# Patient Record
Sex: MALE | Race: WHITE | NOT HISPANIC OR LATINO | Employment: OTHER | URBAN - METROPOLITAN AREA
[De-identification: names, ages, dates, MRNs, and addresses within clinical notes are randomized per-mention and may not be internally consistent; named-entity substitution may affect disease eponyms.]

---

## 2023-10-11 ENCOUNTER — OFFICE VISIT (OUTPATIENT)
Dept: PRIMARY CARE | Facility: CLINIC | Age: 63
End: 2023-10-11
Payer: MEDICARE

## 2023-10-11 VITALS
RESPIRATION RATE: 17 BRPM | DIASTOLIC BLOOD PRESSURE: 86 MMHG | HEART RATE: 72 BPM | WEIGHT: 220 LBS | HEIGHT: 73 IN | SYSTOLIC BLOOD PRESSURE: 130 MMHG | TEMPERATURE: 97.8 F | BODY MASS INDEX: 29.16 KG/M2

## 2023-10-11 DIAGNOSIS — R29.898 HAND WEAKNESS: Primary | ICD-10-CM

## 2023-10-11 DIAGNOSIS — M50.90 CERVICAL NECK PAIN WITH EVIDENCE OF DISC DISEASE: ICD-10-CM

## 2023-10-11 PROBLEM — B36.0 TINEA VERSICOLOR: Status: ACTIVE | Noted: 2023-10-11

## 2023-10-11 PROBLEM — R61 EXCESSIVE SWEATING: Status: ACTIVE | Noted: 2023-10-11

## 2023-10-11 PROBLEM — K59.00 CONSTIPATION: Status: ACTIVE | Noted: 2023-10-11

## 2023-10-11 PROBLEM — T14.8XXA BRUISE: Status: ACTIVE | Noted: 2023-10-11

## 2023-10-11 PROBLEM — K59.09 CHRONIC CONSTIPATION: Status: ACTIVE | Noted: 2023-10-11

## 2023-10-11 PROBLEM — D72.829 LEUKOCYTOSIS: Status: ACTIVE | Noted: 2023-10-11

## 2023-10-11 PROBLEM — M79.89 NODULE OF SOFT TISSUE: Status: ACTIVE | Noted: 2023-10-11

## 2023-10-11 PROBLEM — K40.90 INGUINAL HERNIA: Status: ACTIVE | Noted: 2023-10-11

## 2023-10-11 PROBLEM — M19.042 LOCALIZED OSTEOARTHRITIS OF LEFT HAND: Status: ACTIVE | Noted: 2023-10-11

## 2023-10-11 PROBLEM — R07.9 CHEST PAIN: Status: ACTIVE | Noted: 2023-10-11

## 2023-10-11 PROBLEM — H61.20 CERUMEN IMPACTION: Status: ACTIVE | Noted: 2023-10-11

## 2023-10-11 PROBLEM — M75.100 ROTATOR CUFF TEAR: Status: ACTIVE | Noted: 2023-10-11

## 2023-10-11 PROBLEM — M19.012 PRIMARY OSTEOARTHRITIS OF LEFT SHOULDER: Status: ACTIVE | Noted: 2023-10-11

## 2023-10-11 PROBLEM — C49.9 SOFT TISSUE SARCOMA (MULTI): Status: ACTIVE | Noted: 2023-10-11

## 2023-10-11 PROBLEM — R35.0 URINARY FREQUENCY: Status: ACTIVE | Noted: 2023-10-11

## 2023-10-11 PROBLEM — M47.812 DJD (DEGENERATIVE JOINT DISEASE), CERVICAL: Status: ACTIVE | Noted: 2023-10-11

## 2023-10-11 PROBLEM — M25.512 LEFT SHOULDER PAIN: Status: ACTIVE | Noted: 2023-10-11

## 2023-10-11 PROBLEM — M54.2 NECK PAIN: Status: ACTIVE | Noted: 2023-10-11

## 2023-10-11 PROCEDURE — 99213 OFFICE O/P EST LOW 20 MIN: CPT | Performed by: FAMILY MEDICINE

## 2023-10-11 ASSESSMENT — ENCOUNTER SYMPTOMS
BRUISES/BLEEDS EASILY: 0
FATIGUE: 0
CONSTITUTIONAL NEGATIVE: 1
NECK STIFFNESS: 1
CHEST TIGHTNESS: 0
ENDOCRINE NEGATIVE: 1
NUMBNESS: 1
WEAKNESS: 1
SHORTNESS OF BREATH: 0
BACK PAIN: 1
MYALGIAS: 1
HEADACHES: 0
DIZZINESS: 0
NECK PAIN: 1
ARTHRALGIAS: 1

## 2023-10-11 ASSESSMENT — PATIENT HEALTH QUESTIONNAIRE - PHQ9
1. LITTLE INTEREST OR PLEASURE IN DOING THINGS: NOT AT ALL
2. FEELING DOWN, DEPRESSED OR HOPELESS: NOT AT ALL
SUM OF ALL RESPONSES TO PHQ9 QUESTIONS 1 AND 2: 0

## 2023-10-11 NOTE — PROGRESS NOTES
Subjective   Patient ID: Toño Bustamante is a 62 y.o. male who presents for Referral (Pt is here to discus a referral for rheumatologist. ).  HPI    Review of Systems   Constitutional: Negative.  Negative for fatigue.   Respiratory:  Negative for chest tightness and shortness of breath.    Cardiovascular:  Negative for chest pain.   Endocrine: Negative.    Musculoskeletal:  Positive for arthralgias, back pain, myalgias, neck pain and neck stiffness.   Neurological:  Positive for weakness and numbness. Negative for dizziness and headaches.   Hematological:  Does not bruise/bleed easily.       Objective   Physical Exam  Constitutional:       Appearance: Normal appearance.   Cardiovascular:      Rate and Rhythm: Normal rate and regular rhythm.   Pulmonary:      Effort: Pulmonary effort is normal.      Breath sounds: Normal breath sounds.   Musculoskeletal:      Left shoulder: Tenderness and crepitus present. Decreased range of motion.      Left hand: Deformity, tenderness and bony tenderness present. Decreased range of motion.      Cervical back: Rigidity, spasms and tenderness present. Pain with movement present. Decreased range of motion.      Lumbar back: Tenderness present.   Lymphadenopathy:      Cervical: No cervical adenopathy.   Neurological:      General: No focal deficit present.      Mental Status: He is alert and oriented to person, place, and time.   Psychiatric:         Mood and Affect: Mood normal.         Assessment/Plan   Problem List Items Addressed This Visit    None  Visit Diagnoses         Codes    Hand weakness    -  Primary R29.898    Relevant Orders    Referral to Orthopaedic Surgery    Referral to Orthopaedic Surgery    Cervical neck pain with evidence of disc disease     M50.90    Relevant Orders    Referral to Orthopaedic Surgery

## 2023-10-20 ENCOUNTER — ANCILLARY PROCEDURE (OUTPATIENT)
Dept: RADIOLOGY | Facility: CLINIC | Age: 63
End: 2023-10-20
Payer: MEDICARE

## 2023-10-20 ENCOUNTER — OFFICE VISIT (OUTPATIENT)
Dept: ORTHOPEDIC SURGERY | Facility: CLINIC | Age: 63
End: 2023-10-20
Payer: MEDICARE

## 2023-10-20 DIAGNOSIS — R29.898 HAND WEAKNESS: ICD-10-CM

## 2023-10-20 DIAGNOSIS — R29.898 HAND WEAKNESS: Primary | ICD-10-CM

## 2023-10-20 DIAGNOSIS — G56.02 CARPAL TUNNEL SYNDROME OF LEFT WRIST: ICD-10-CM

## 2023-10-20 PROCEDURE — 99204 OFFICE O/P NEW MOD 45 MIN: CPT | Performed by: STUDENT IN AN ORGANIZED HEALTH CARE EDUCATION/TRAINING PROGRAM

## 2023-10-20 PROCEDURE — 99214 OFFICE O/P EST MOD 30 MIN: CPT | Performed by: STUDENT IN AN ORGANIZED HEALTH CARE EDUCATION/TRAINING PROGRAM

## 2023-10-20 PROCEDURE — 73130 X-RAY EXAM OF HAND: CPT | Mod: LT,FY

## 2023-10-20 PROCEDURE — 73130 X-RAY EXAM OF HAND: CPT | Mod: LEFT SIDE | Performed by: RADIOLOGY

## 2023-10-20 RX ORDER — ACETAMINOPHEN AND DIPHENHYDRAMINE HYDROCHLORIDE 500; 25 MG/1; MG/1
1 TABLET, FILM COATED ORAL NIGHTLY PRN
COMMUNITY
End: 2024-05-14 | Stop reason: HOSPADM

## 2023-10-20 RX ORDER — IBUPROFEN 200 MG
200 TABLET ORAL EVERY 6 HOURS PRN
COMMUNITY
End: 2024-05-14 | Stop reason: HOSPADM

## 2023-10-20 RX ORDER — IBUPROFEN 100 MG/5ML
1000 SUSPENSION, ORAL (FINAL DOSE FORM) ORAL DAILY
COMMUNITY
Start: 2012-10-11 | End: 2023-11-14 | Stop reason: WASHOUT

## 2023-10-20 RX ORDER — CYCLOBENZAPRINE HCL 10 MG
10 TABLET ORAL 3 TIMES DAILY
COMMUNITY
Start: 2021-03-14 | End: 2023-11-13 | Stop reason: SDUPTHER

## 2023-10-20 RX ORDER — GLUCOSAMINE/MSM/CHONDROIT SULF 500-166.6
1 TABLET ORAL DAILY
COMMUNITY
Start: 2012-10-11 | End: 2024-05-14 | Stop reason: HOSPADM

## 2023-10-20 NOTE — PROGRESS NOTES
History of Present Illness:  Presents with  left hand numbness. The symptoms have been present for months. The patient denies any inciting trauma. The numbness primarily involves the thumb, index finger, and long finger. There is minimal numbness in the small finger. The patient complains of intermittant, moderate hand pain which is worse at night. It causes frequent night awakenings. The patient presents due to worsening symptoms.     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  States that he has always had some issues to the left hand but he has noticed progressive weakness and numbness over the course of the past weeks to months.  Now having difficulty with buttoning his pants objects due to numbness digits.    Scheduled follow-up with spine To evaluate degenerative disc disease at C5-6.    Review of Systems   GENERAL: Negative for malaise, significant weight loss, fever  MUSCULOSKELETAL: see HPI  NEURO:  Negative    The patient's past medical history, family history, social history, and review of systems were reviewed. History is otherwise negative except as stated in the HPI.    Physical Examination:  The patient appears to be their stated age, is in no apparent distress, and is oriented x3. The patients mood and affect are appropriate. The patients gait is normal. The examination of left upper extremity performed.  On musculoskeletal examination, the patient has full elbow range of motion. There is no tenderness to palpation about the lateral epicondyle. Tinels at the cubital tunnel and elbow flexion/compression are negative for ulnar nerve symptoms. In regards to the wrist, there is no obvious deformity. Range of motion is full in flexion, extension, pronation, and supination. Strength is 5/5 in flexion and extension. There is no tenderness to palpation about the 1st dorsal compartment, the 1st CMC joint, or the TFCC. Tinels at the carpal  tunnel and Durkins compression test are positive. The patient has subjective paresthesias in the median nerve distribution. Sensation and motor function are intact in the radial, and ulnar nerve distribution. There is no obvious thenar atrophy, and thenar strength is 4/5. There is no intrinsic atrophy, and intrinsic strength is 5/5. All fingers are without triggering and are without pain over the A1 pulley. The patient can make a full composite fist. The hand itself is warm and well perfused. The skin is intact throughout. The contralateral hand/wrist are normal to inspection, range of motion, stability, and strength.    Imaging:  Left hand AP lateral oblique reviewed.  Diffuse osteoarthritis.  Deformity of the long finger DIP with associated arthritis.  Significant arthritis to index MCP    Assessment:  Patient with left carpal tunnel syndrome.  Also with degenerative joint disease to the spine and concern for double crush.    Plan:   EMG. I had a long discussion with the patient regarding the diagnosis of CTS and its treatment. At this point, I have recommended an EMG/NCV study to help confirm the diagnosis and assess severity. I would like to seem them back in the office after this study. In the meantime, I have recommended nighttime wrist splinting in the neutral position. When I see them back, we will determine how to proceed based on the results of the nerve study and the trial of splinting.        April Real MD  Orthopaedic Surgeon

## 2023-10-24 ENCOUNTER — HOSPITAL ENCOUNTER (OUTPATIENT)
Dept: NEUROLOGY | Facility: HOSPITAL | Age: 63
Discharge: HOME | End: 2023-10-24
Payer: MEDICARE

## 2023-10-24 DIAGNOSIS — G56.02 CARPAL TUNNEL SYNDROME OF LEFT WRIST: ICD-10-CM

## 2023-10-24 PROCEDURE — 95911 NRV CNDJ TEST 9-10 STUDIES: CPT | Performed by: PSYCHIATRY & NEUROLOGY

## 2023-10-24 PROCEDURE — 95886 MUSC TEST DONE W/N TEST COMP: CPT | Performed by: PSYCHIATRY & NEUROLOGY

## 2023-10-31 ENCOUNTER — OFFICE VISIT (OUTPATIENT)
Dept: ORTHOPEDIC SURGERY | Facility: CLINIC | Age: 63
End: 2023-10-31
Payer: MEDICARE

## 2023-10-31 DIAGNOSIS — G56.02 CARPAL TUNNEL SYNDROME OF LEFT WRIST: Primary | ICD-10-CM

## 2023-10-31 PROCEDURE — 99214 OFFICE O/P EST MOD 30 MIN: CPT | Performed by: STUDENT IN AN ORGANIZED HEALTH CARE EDUCATION/TRAINING PROGRAM

## 2023-10-31 NOTE — PROGRESS NOTES
Follow up:  The patient returns today to review their EMG/NCV. Their symptoms are persistent and limiting.      Physical Examination:  unchanged    Imaging:  I independently interpreted the EMG (and reviewed its associated report) which reveals  severe CTS with motor axon loss    Assessment:  Patient with severe cts    Plan:  Based on the history, physical exam, and EMG/NCV study, I believe that the patient has severe CTS. Given the duration of symptoms and the failure of non-operative treatment with splinting and/or corticosteroid injections, I have recommended surgery.  The benefit of surgery would be to stop the progression of symptoms, with the hope that the symptoms would also resolve. The risks of surgery include, but are not limited to, infection, bleeding, nerve/vessel injury, pillar pain, continued symptoms, and anesthetic complications. The patient understood the risks/benefits and consented to surgery. I will schedule them in the near future. I have answered all questions regarding the surgery itself and the post-operative course.

## 2023-11-01 PROBLEM — G56.02 CARPAL TUNNEL SYNDROME, LEFT UPPER LIMB: Status: ACTIVE | Noted: 2023-10-31

## 2023-11-10 ENCOUNTER — APPOINTMENT (OUTPATIENT)
Dept: ORTHOPEDIC SURGERY | Facility: CLINIC | Age: 63
End: 2023-11-10
Payer: MEDICARE

## 2023-11-13 ENCOUNTER — OFFICE VISIT (OUTPATIENT)
Dept: ORTHOPEDIC SURGERY | Facility: CLINIC | Age: 63
End: 2023-11-13
Payer: MEDICARE

## 2023-11-13 ENCOUNTER — ANCILLARY PROCEDURE (OUTPATIENT)
Dept: RADIOLOGY | Facility: CLINIC | Age: 63
End: 2023-11-13
Payer: MEDICARE

## 2023-11-13 DIAGNOSIS — M54.2 NECK PAIN: ICD-10-CM

## 2023-11-13 DIAGNOSIS — M54.2 NECK PAIN: Primary | ICD-10-CM

## 2023-11-13 PROCEDURE — 99214 OFFICE O/P EST MOD 30 MIN: CPT | Performed by: PHYSICIAN ASSISTANT

## 2023-11-13 PROCEDURE — 72050 X-RAY EXAM NECK SPINE 4/5VWS: CPT | Performed by: RADIOLOGY

## 2023-11-13 PROCEDURE — 72050 X-RAY EXAM NECK SPINE 4/5VWS: CPT

## 2023-11-13 PROCEDURE — 99214 OFFICE O/P EST MOD 30 MIN: CPT | Mod: 25 | Performed by: PHYSICIAN ASSISTANT

## 2023-11-13 RX ORDER — CYCLOBENZAPRINE HCL 10 MG
10 TABLET ORAL NIGHTLY PRN
Qty: 30 TABLET | Refills: 0 | Status: SHIPPED | OUTPATIENT
Start: 2023-11-13 | End: 2024-02-21 | Stop reason: ALTCHOICE

## 2023-11-13 NOTE — PROGRESS NOTES
Toño Bustamante is a 62 y.o. male who presents for New Patient Visit of the Neck (Xrays today, Neck pain).    HPI:  62-year-old gentleman with chronic history of neck pain.  He denies any fever chills nausea vomiting night sweats he has no bowel or bladder complaints.    Physical exam:  Well-nourished, well kept.  Patient has right arm amputated in 1984.  No examination to that side.  No lymphangitis or lymphadenopathy in the examined extremities.  Good perfusion to the extremities.  No distal edema.  Patient can rise from a seated position, can sit from a standing position. Can get up heels and toes.  Patient is tender in the paraspinal musculature of the cervical spine is range of motion is mildly decreased secondary to some pain and stiffness no weakness no instability to muscle strength. examination of the upper extremities reveals no point tenderness, swelling, or deformity.  Range of motion of the LEFT shoulder, elbow, wrist, and fingers are full without crepitance, instability, or exacerbation of pain. Strength is 5/5 throughout. XX no redness, abrasions, or lesions on the upper extremities bilaterally.  Gross sensation intact to the extremities.  Deep tendon reflexes 2+ and symmetric bilaterally.  Ferraro negative.  Affect normal.  Alert and oriented X 3.  Coordination normal. Positive Tinnell's left.    Imaging studies:  AP lateral flexion-extension plain films of the cervical spine were obtained and reviewed today.  Multilevel moderate to severe degenerative changes noted most significant at C5-6 C6-7 with loss of disc space height and anterior osteophyte formation.    Assessment:  62-year-old gentleman with chronic history of neck pain.  He had his right arm amputated in 1984 due to cancer.  Has had a chronic history of neck pain with limited range of motion.  He is having carpal tunnel surgery on his left wrist with Dr. Malcom Bedoya in 1 week.  He has done some physical therapy in the past.  Nothing  recently.  No surgery on his neck no recent shots in his neck.  He is not really describing any radicular type symptoms this is mostly a neck and trapezius issue.    Plan:  Like to get him into some physical therapy, something with a manual component with modalities.  We can get him some Flexeril to take at night at bedtime to help with spasms.  I will see him back in 6 weeks, see how he is healing from his carpal tunnel surgery and see how his neck is doing.  If he is not getting any better with therapy and some muscle relaxers we can get an MRI of his neck and work this up further.

## 2023-11-13 NOTE — DISCHARGE INSTRUCTIONS
HAND SURGERY  Post Operative Instructions   April Real MD     Dressing  You have a bulky dressing on your hand.      You may remove the dressing from your hand…    - 5 days after your surgery    - Please cover the wound with a Band-Aid (NOT WATERPROOF TYPE) or gauze and change it daily.  Please leave wound open to air when at home. Do not use any ointments, peroxide, betadine, alcohol ect. on your wound.     - After your dressing is off, do not do any strenuous activities with your hand until seen by OT or Dr. Real within 2 weeks postoperatively. Please call Dr. Real's office if this is not already arranged.    If you experience persistent numbness, tingling or burning sensation in your hand, it may be due to a cast or surgical dressing that is too tight. Keep your hand elevated, and if this does not resolve the problem, call your doctor immediately.    If your dressing gets wet, contact your physician’s office.     Activity  If pain will allow, try to flex and extend the fingers on the operated hand.  The dressing and swelling may cause the fingers to be stiff and this is common.  If the fingers turn blue, purple, or remain numb after the block has worn off, call the office immediately.      Showering  You may shower as soon as you want after surgery. Please cover the dressing with a bag.    You may shower and get the wound wet after you remove your dressing.  You may allow the water to run over the incisions and pat the incision dry. DO NOT let the wound soak in a tub, pool, or dish water. If you are using a band aid to cover your incision after a shower, make sure the incision is completely dry.    Ice & Elevate  The use of ice on your arm/hand after surgery will help with both pain control and swelling.  Continue with icing your arm/hand for the first 48 hours after surgery.  Thereafter, use it on an as needed basis.    Elevate your hand above your heart level as much as possible for the first 48 hours,  even while walking.  This will keep the swelling to a minimum and prevent throbbing and pain. Elevation reduces swelling and minimizes pain. Less swelling is associated with a lower infection rate, fewer wound complications, less post-operative stiffness, and more rapid recovery of function. To keep the swelling down, your hand must be kept above the level of your heart.     One common mistake people make is they will put pillows or blankets under their elbow while sitting or laying down. Please always keep the hand above the elbow and move your fingers as much as possible! Swelling tends to collect in the lowest part of the body so if your hand is below the rest of your arm, your fingers and hand will swell and become stiff.    Pain Medication  You will be discharged from the surgery center to home with an oral pain medication (analgesic).  The majority of patients use antiinflammatories like Motrin or Tylenol with sufficient pain relief but the prescription is available for breakthrough pain not responsive to the antiinflammatory medications. Rest and elevation is still one of the most important factors for pain control.     DO NOT drink alcoholic beverages or smoke while taking the prescription pain medication. DO NOT drive a car or other vehicle or operate any machinery while under the influence of your medication.    It is important NOT TO WAIT until your pain is severe before taking your medication since the medications often take an hour or longer before you will begin to feel some relief. Take the medication as soon as you experience even mild pain the first night after surgery. Usually by the second or third day after surgery your upper extremity will begin to feel better and you will require much less pain medication.    **Prescription refills will only be addressed during normal business hours.  Narcotic refills will not be addressed after hours or on weekends as the physician on call does not have access  to your medical records.**    Side Effects: All pain medications can cause nausea, vomiting, and/or constipation. It is best to take pain medication with food. If these problems become significant, please contact our office. Have a phone number for your pharmacy available.    Diet  Eat light the day of surgery and resume normal diet tomorrow. Increase your fluid intake due to pain medications    Driving  It is not advisable to drive a vehicle while you are on pain medication, due to the possible side effects.  However, once you are off pain medication and you feel that you are able to safely control the vehicle, you may drive.    Warning Signs  Fever: A low-grade fever (less than 101 degrees) following surgery and lasting for several days is quite common. You may even have some slight chills and sweating. If you have a low-grade fever you should make an effort to cough and breathe deeply to clear congestion from your lungs.     After hospital discharge, call your physician if you experience:  A temperature over 101 degrees (38.6 C)   Increasing pain (despite adequate elevation and maximal oral pain medication)   Increasing or foul smelling drainage (after the first 24 hours)     If any of the above occurs, please notify Dr. Real's office.      Follow-up Appointments    We are interested in your prompt and complete recovery from surgery. If you have any problems or questions concerning your recovery, please call your doctor’s office.  Your doctor’s office can be called Monday --Friday, 8:00 am to 5:00 pm.     You should already have an appointment to see Dr. Real within the next few weeks.  If you do not have this appointment, or need to change your appointment time, please contact our office.     Do not hesitate to call with any questions or concerns.      Dr. Real's office numbers are:    1) During normal business hours, 8AM to 5PM Monday through Friday, please call: [phone]  2) After hours questions  can be directed to the physician on call at [phone]  Nerve Blocks    Why is this procedure done?  Nerve blocks can help to manage pain. By giving you a drug into an exact group of nerves, your doctor may be able to block pain to a specific part of your body. Some nerve blocks are used after surgery, especially if you have had an abdominal surgery. Nerve blocks are used before surgery to lessen the need for opioid drugs during and after surgery.  There are a few kinds of nerve blocks. Some nerve blocks are used to:  Treat pain. These may have a pain drug and a drug to help with swelling.  Find where your pain is coming from. This kind of nerve block will have a pain drug that lasts for only a certain amount of time.  See if another kind of treatment like surgery will help your pain.  Prevent pain during or after a procedure.  Help you avoid surgery.  Give relief of pain during and after surgery.  Lessen the use of opioid drugs needed for pain after surgery.  Block the pain in an area of the body during surgery as anesthesia.  What will the results be?  The nerve block may help to treat or ease your pain. The area may be numb. You may have some pain relief right away. You may be able to use fewer pain medicines after surgery. It also may be easier for you to move around after surgery. Some nerve blocks go away within a few hours. Others give you pain relief for a day or so to a few months or longer. Some nerve blocks can take a few days to work fully.  What happens before the procedure?  Your doctor will ask you about your health history. Talk to the doctor about:  All the drugs you are taking. Be sure to include all prescription, over the counter, and herbal supplements. Tell the doctor if you have any drug allergy. Bring a list of drugs you take with you.  Any bleeding problems. Be sure to tell your doctor if you are taking any drugs that may cause bleeding. Some of these are warfarin, rivaroxaban, apixaban,  ticagrelor, clopidogrel, ibuprofen, naproxen, or aspirin. Certain vitamins and herbs, such as garlic and fish oil, may also add to the risk for bleeding. You may need to stop these drugs as well. Talk to your doctor about them.  What happens during the procedure?  Sometimes, the doctor will give you a special drug to make you sleepy for the nerve block. Other times, you are completely awake. You may also have a nerve block as a part of your surgery.  The doctor will position you in a way to give them easy access to where you will be having the nerve block.  The doctor will clean the area and give you a local numbing drug. The doctor will use a long thin needle to give you the nerve block. Often, the doctor will use a special x-ray, ultrasound, or CT scan to make sure the needle is in the right place. The doctor will inject the drug close to the nerve that is causing your pain. Sometimes they inject the drug in an area that will block pain from a few nerves.  The doctor will take out the needle and place a clean bandage on your skin.  Sometimes, the doctor may leave a catheter in place to deliver medicine over 1 to 2 days. You may have to return to your doctor's office to have the catheter removed.  The procedure takes 15 to 30 minutes.  What happens after the procedure?  If you are not having surgery, you will be able to go home the same day. You may be asked to rest for 15 to 30 minutes. If the doctor gives you a special drug to make you sleepy for the procedure, you will need someone to drive you home.  What care is needed at home?  You will be allowed to shower or take a bath later that same day unless you have a catheter still in place.  You may need other medicines to help with pain as your nerve block wears off.  What follow-up care is needed?  As your body absorbs the drugs, your pain may come back. Talk to your doctor about if you need another nerve block and how often you can have a nerve block.  What  problems could happen?  Infection  Bleeding or bruising  Pain at the injection site  Raised blood sugar  Rash  Itching  Numbness  Nerve injury  Allergic reaction  Puncture or laceration of an organ like the liver, spleen. or bowel  Numbing medicine injected into a blood vessel  Where can I learn more?  American Society of Regional Anesthesia  https://www.michelle.com/patient-information/regional-anesthesia  NHS  https://www.nhs.uk/conditions/epidural/  [facility]  https://www.nhs.uk/conditions/local-anaesthesia/  [employer] [place]  http://www.radiologyinfo.org/en/info.cfm?pg=nerveblock  Last Reviewed Date  2020-04-22

## 2023-11-14 RX ORDER — DEXTROMETHORPHAN HYDROBROMIDE, GUAIFENESIN 5; 100 MG/5ML; MG/5ML
650 LIQUID ORAL EVERY 8 HOURS PRN
COMMUNITY

## 2023-11-14 NOTE — PREPROCEDURE INSTRUCTIONS
Reviewed medical history and current medications. Patient is having a local procedure. Instructed patient to continue medications with no dietary/fluid restrictions.

## 2023-11-16 ENCOUNTER — HOSPITAL ENCOUNTER (OUTPATIENT)
Facility: HOSPITAL | Age: 63
Setting detail: OUTPATIENT SURGERY
Discharge: HOME | End: 2023-11-16
Attending: STUDENT IN AN ORGANIZED HEALTH CARE EDUCATION/TRAINING PROGRAM | Admitting: STUDENT IN AN ORGANIZED HEALTH CARE EDUCATION/TRAINING PROGRAM
Payer: MEDICARE

## 2023-11-16 VITALS
DIASTOLIC BLOOD PRESSURE: 78 MMHG | WEIGHT: 218.56 LBS | BODY MASS INDEX: 28.97 KG/M2 | HEIGHT: 73 IN | OXYGEN SATURATION: 97 % | HEART RATE: 67 BPM | SYSTOLIC BLOOD PRESSURE: 133 MMHG | TEMPERATURE: 97.5 F | RESPIRATION RATE: 17 BRPM

## 2023-11-16 DIAGNOSIS — G56.02 CARPAL TUNNEL SYNDROME, LEFT UPPER LIMB: Primary | ICD-10-CM

## 2023-11-16 PROCEDURE — 2500000005 HC RX 250 GENERAL PHARMACY W/O HCPCS: Performed by: STUDENT IN AN ORGANIZED HEALTH CARE EDUCATION/TRAINING PROGRAM

## 2023-11-16 PROCEDURE — 7100000010 HC PHASE TWO TIME - EACH INCREMENTAL 1 MINUTE: Performed by: STUDENT IN AN ORGANIZED HEALTH CARE EDUCATION/TRAINING PROGRAM

## 2023-11-16 PROCEDURE — 64721 CARPAL TUNNEL SURGERY: CPT | Performed by: STUDENT IN AN ORGANIZED HEALTH CARE EDUCATION/TRAINING PROGRAM

## 2023-11-16 PROCEDURE — 2500000004 HC RX 250 GENERAL PHARMACY W/ HCPCS (ALT 636 FOR OP/ED): Performed by: STUDENT IN AN ORGANIZED HEALTH CARE EDUCATION/TRAINING PROGRAM

## 2023-11-16 PROCEDURE — 7100000009 HC PHASE TWO TIME - INITIAL BASE CHARGE: Performed by: STUDENT IN AN ORGANIZED HEALTH CARE EDUCATION/TRAINING PROGRAM

## 2023-11-16 PROCEDURE — 3600000008 HC OR TIME - EACH INCREMENTAL 1 MINUTE - PROCEDURE LEVEL THREE: Performed by: STUDENT IN AN ORGANIZED HEALTH CARE EDUCATION/TRAINING PROGRAM

## 2023-11-16 PROCEDURE — 3600000003 HC OR TIME - INITIAL BASE CHARGE - PROCEDURE LEVEL THREE: Performed by: STUDENT IN AN ORGANIZED HEALTH CARE EDUCATION/TRAINING PROGRAM

## 2023-11-16 PROCEDURE — A4217 STERILE WATER/SALINE, 500 ML: HCPCS | Performed by: STUDENT IN AN ORGANIZED HEALTH CARE EDUCATION/TRAINING PROGRAM

## 2023-11-16 RX ORDER — LIDOCAINE HYDROCHLORIDE AND EPINEPHRINE 10; 10 MG/ML; UG/ML
20 INJECTION, SOLUTION INFILTRATION; PERINEURAL ONCE
Status: COMPLETED | OUTPATIENT
Start: 2023-11-16 | End: 2023-11-16

## 2023-11-16 RX ORDER — SODIUM CHLORIDE 0.9 G/100ML
IRRIGANT IRRIGATION AS NEEDED
Status: DISCONTINUED | OUTPATIENT
Start: 2023-11-16 | End: 2023-11-16 | Stop reason: HOSPADM

## 2023-11-16 RX ORDER — TRAMADOL HYDROCHLORIDE 50 MG/1
50 TABLET ORAL EVERY 8 HOURS PRN
Qty: 5 TABLET | Refills: 0 | Status: SHIPPED | OUTPATIENT
Start: 2023-11-16 | End: 2023-11-19

## 2023-11-16 RX ORDER — LIDOCAINE HYDROCHLORIDE AND EPINEPHRINE 10; 10 MG/ML; UG/ML
20 INJECTION, SOLUTION INFILTRATION; PERINEURAL ONCE
Status: DISCONTINUED | OUTPATIENT
Start: 2023-11-16 | End: 2023-11-16

## 2023-11-16 RX ADMIN — LIDOCAINE HYDROCHLORIDE,EPINEPHRINE BITARTRATE 20 ML: 10; .01 INJECTION, SOLUTION INFILTRATION; PERINEURAL at 06:31

## 2023-11-16 ASSESSMENT — COLUMBIA-SUICIDE SEVERITY RATING SCALE - C-SSRS
1. IN THE PAST MONTH, HAVE YOU WISHED YOU WERE DEAD OR WISHED YOU COULD GO TO SLEEP AND NOT WAKE UP?: NO
2. HAVE YOU ACTUALLY HAD ANY THOUGHTS OF KILLING YOURSELF?: NO
6. HAVE YOU EVER DONE ANYTHING, STARTED TO DO ANYTHING, OR PREPARED TO DO ANYTHING TO END YOUR LIFE?: NO

## 2023-11-16 ASSESSMENT — PAIN - FUNCTIONAL ASSESSMENT
PAIN_FUNCTIONAL_ASSESSMENT: 0-10
PAIN_FUNCTIONAL_ASSESSMENT: 0-10

## 2023-11-16 ASSESSMENT — PAIN SCALES - GENERAL
PAINLEVEL_OUTOF10: 0 - NO PAIN

## 2023-11-16 NOTE — H&P
Updated H&P    Patient presents for local hand surgery.     Prior notes, medication, PMH, PSH, allergies reviewed.     PE  CTAB  RRR  Abdomen soft    Plan to proceed with elective surgery today  All questions addressed     April Beatty MD

## 2023-11-16 NOTE — OP NOTE
CARPAL TUNNEL RELEASE- LOCAL  PREOPERATIVE DIAGNOSIS:   left Carpal Tunnel Syndrome  POSTOPERATIVE DIAGNOSIS: left Carpal Tunnel Syndrome  PROCEDURE:                              left Open Carpal Tunnel Release ([id number])    SURGEON:                                    ESTEFANÍA STEELE MD    Assistant: Montserrat ALEMAN    ANESTHESIA:                              Local.    COMPLICATIONS:                     None.    Patient Name: Toño Bustamante  MRN: [id number]  Time: 8:10 AM    Estimated Blood Loss: 0ml    Problem List:  Patient Active Problem List   Diagnosis    Bruise    Cerumen impaction    Chest pain    Chronic constipation    Constipation    DJD (degenerative joint disease), cervical    Excessive sweating    Inguinal hernia    Left shoulder pain    Leukocytosis    Localized osteoarthritis of left hand    Neck pain    Nodule of soft tissue    Primary osteoarthritis of left shoulder    Rotator cuff tear    Soft tissue sarcoma (CMS/HCC)    Urinary frequency    Tinea versicolor    Carpal tunnel syndrome, left upper limb       INDICATIONS FOR SURGERY:    The patient has been treated for on-going carpal tunnel syndrome that has failed non-operative treatment. Surgical release was offered. Potential benefits include relief of paraesthesias and preventing progression of nerve dysfunction. Risks include bleeding, infection, nerve injury, tendon injury, persistent numbness & paraesthesias, and pillar pain. The patient understood and consented willingly to surgery. The patient also understood the recovery and rehabilitation process.    DESCRIPTION OF PROCEDURE:  The patient was brought to the operating room and identified by the surgical staff. The operative limb was also confirmed by the surgical staff. The operative site in the hand was injected with a local anesthetic for timo-operative pain control. Next, the median nerve in the wrist was injected with a local anesthestic for post-operative pain control. There was no  anesthesiology staff present nor any administration of anesthetics through an IV during the case.  The operative limb was then prepped and draped in standard sterile fashion. A 2cm longitudinal incision was placed in the inter-thenar valley of the hand in line with the third webspace. Diligent hemostasis was maintained with bipolar cautery. The superficial palmar fascia was cut in line with the skin incision. The transverse carpal ligament was identified. Once satisfied that the recurrent motor branch was not traversing the ligament it was released completely distally to the sentinel pad of fat and proximally past the wrist crease into the forearm. Once satisfied that the carpal tunnel and median nerve was completely decompressed the wound was washed. The incision was closed. A soft dressing was applied. The patient was taken to the recovery in stable condition.  I was present for the entire length of the case.     April Beatty MD

## 2023-11-16 NOTE — POST-PROCEDURE NOTE
I was the surgical first assist to Dr. Real for Toño Bustamante's procedure on 11/16/23.    Procedure:   Left Open Carpal Tunnel Release     Montserrat Dunn PA-C

## 2023-12-01 ENCOUNTER — OFFICE VISIT (OUTPATIENT)
Dept: ORTHOPEDIC SURGERY | Facility: CLINIC | Age: 63
End: 2023-12-01
Payer: MEDICARE

## 2023-12-01 DIAGNOSIS — R29.898 HAND WEAKNESS: ICD-10-CM

## 2023-12-01 PROCEDURE — 1160F RVW MEDS BY RX/DR IN RCRD: CPT | Performed by: STUDENT IN AN ORGANIZED HEALTH CARE EDUCATION/TRAINING PROGRAM

## 2023-12-01 PROCEDURE — 1125F AMNT PAIN NOTED PAIN PRSNT: CPT | Performed by: STUDENT IN AN ORGANIZED HEALTH CARE EDUCATION/TRAINING PROGRAM

## 2023-12-01 PROCEDURE — 1159F MED LIST DOCD IN RCRD: CPT | Performed by: STUDENT IN AN ORGANIZED HEALTH CARE EDUCATION/TRAINING PROGRAM

## 2023-12-01 PROCEDURE — 99024 POSTOP FOLLOW-UP VISIT: CPT | Performed by: STUDENT IN AN ORGANIZED HEALTH CARE EDUCATION/TRAINING PROGRAM

## 2023-12-01 RX ORDER — CEPHALEXIN 500 MG/1
500 CAPSULE ORAL EVERY 6 HOURS
Qty: 28 CAPSULE | Refills: 0 | Status: SHIPPED | OUTPATIENT
Start: 2023-12-01 | End: 2023-12-08

## 2023-12-01 NOTE — PROGRESS NOTES
S/p LCTR. Some drainage to the incision    Physical Examination:  The patient appears to be their stated age, is in no apparent distress, and is oriented x3. The patients mood and affect are appropriate. The patients gait is normal. The examination of the limb in question was performed in comparison to the contralateral limb.    Some drainage to incision, with some dehiscence.   AIN, PIN, ulnar intact  SILT A/R/U/M  Hand wwp      Assessment:  2 weeks post op    Plan:   some drainage and dehiscence.  I also would like him to do twice daily soapy baths.  Remainder of the time the incision should be kept open to air.  I would like him to start antibiotics for 1 week and follow-up next week for clinical check.    April Real MD

## 2023-12-08 ENCOUNTER — OFFICE VISIT (OUTPATIENT)
Dept: ORTHOPEDIC SURGERY | Facility: CLINIC | Age: 63
End: 2023-12-08
Payer: MEDICARE

## 2023-12-08 DIAGNOSIS — G56.02 CARPAL TUNNEL SYNDROME OF LEFT WRIST: Primary | ICD-10-CM

## 2023-12-08 PROCEDURE — 99024 POSTOP FOLLOW-UP VISIT: CPT | Performed by: STUDENT IN AN ORGANIZED HEALTH CARE EDUCATION/TRAINING PROGRAM

## 2023-12-08 PROCEDURE — 1125F AMNT PAIN NOTED PAIN PRSNT: CPT | Performed by: STUDENT IN AN ORGANIZED HEALTH CARE EDUCATION/TRAINING PROGRAM

## 2023-12-08 PROCEDURE — 1159F MED LIST DOCD IN RCRD: CPT | Performed by: STUDENT IN AN ORGANIZED HEALTH CARE EDUCATION/TRAINING PROGRAM

## 2023-12-08 PROCEDURE — 1160F RVW MEDS BY RX/DR IN RCRD: CPT | Performed by: STUDENT IN AN ORGANIZED HEALTH CARE EDUCATION/TRAINING PROGRAM

## 2023-12-08 RX ORDER — SILVER SULFADIAZINE 10 G/1000G
CREAM TOPICAL 2 TIMES DAILY
Status: DISCONTINUED | OUTPATIENT
Start: 2023-12-08 | End: 2024-05-14 | Stop reason: HOSPADM

## 2023-12-08 NOTE — PROGRESS NOTES
S/p LCTR.  3 weeks.  No further drainage from his incision.  It has dried out and is looking improved    Physical Examination:  The patient appears to be their stated age, is in no apparent distress, and is oriented x3. The patients mood and affect are appropriate. The patients gait is normal. The examination of the limb in question was performed in comparison to the contralateral limb.    No further drainage today.  He is healing this by secondary intention.  Further granulation tissue seen  AIN, PIN, ulnar intact  SILT A/R/U/M  Hand wwp      Assessment:  3 weeks post op    Plan:   Continue soaking baths twice a day.  I have prescribed Silvadene but I would like him to use once a day.  Follow-up in 2 weeks.    April Real MD

## 2023-12-11 ENCOUNTER — OFFICE VISIT (OUTPATIENT)
Dept: ORTHOPEDIC SURGERY | Facility: CLINIC | Age: 63
End: 2023-12-11
Payer: MEDICARE

## 2023-12-11 DIAGNOSIS — M50.90 CERVICAL NECK PAIN WITH EVIDENCE OF DISC DISEASE: ICD-10-CM

## 2023-12-11 DIAGNOSIS — G56.02 CARPAL TUNNEL SYNDROME OF LEFT WRIST: Primary | ICD-10-CM

## 2023-12-11 DIAGNOSIS — M54.12 CERVICAL RADICULOPATHY: Primary | ICD-10-CM

## 2023-12-11 DIAGNOSIS — R29.898 HAND WEAKNESS: ICD-10-CM

## 2023-12-11 PROCEDURE — 1159F MED LIST DOCD IN RCRD: CPT | Performed by: PHYSICIAN ASSISTANT

## 2023-12-11 PROCEDURE — 1125F AMNT PAIN NOTED PAIN PRSNT: CPT | Performed by: PHYSICIAN ASSISTANT

## 2023-12-11 PROCEDURE — 99213 OFFICE O/P EST LOW 20 MIN: CPT | Performed by: PHYSICIAN ASSISTANT

## 2023-12-11 PROCEDURE — 1160F RVW MEDS BY RX/DR IN RCRD: CPT | Performed by: PHYSICIAN ASSISTANT

## 2023-12-11 RX ORDER — SILVER SULFADIAZINE 10 G/1000G
CREAM TOPICAL 2 TIMES DAILY
Status: DISCONTINUED | OUTPATIENT
Start: 2023-12-11 | End: 2024-05-14 | Stop reason: HOSPADM

## 2023-12-11 ASSESSMENT — PAIN - FUNCTIONAL ASSESSMENT: PAIN_FUNCTIONAL_ASSESSMENT: NO/DENIES PAIN

## 2023-12-11 NOTE — PROGRESS NOTES
Toño Bustamante is a 63 y.o. male who presents for New Patient Visit of the Neck (Xrays done).    HPI:  63-year-old gentleman here for follow-up after physical therapy.  Still having stiffness and pain in his neck.  He denies any fever chills nausea vomiting night sweats.  He has no bowel or bladder complaints.    Physical exam:  Well-nourished, well kept.  Patient had right arm amputated in 1984.  No examination of the right upper extremity.  Patient can rise from a seated position, can sit from a standing position. Can get up heels and toes.  Patient is tender in the paraspinal musculature of the cervical spine is range of motion is mildly decreased secondary to some pain and stiffness no weakness no instability to muscle strength. examination of the upper extremities reveals no point tenderness, swelling, or deformity.  Range of motion of the LEFT shoulders, elbows, wrists, and fingers are full without crepitance, instability, or exacerbation of pain. Strength is 5/5 throughout. no redness, abrasions, or lesions on the upper extremities bilaterally.  Gross sensation intact to the extremities.  Deep tendon reflexes 2+ and symmetric bilaterally.  Ferraro negative.  Affect normal.  Alert and oriented X 3.  Coordination normal.    No imaging studies were done today.    Assessment:  63-year-old gentleman here for follow-up after physical therapy.  He did 8 visits of therapy so far, he did get a little bit of benefit from it, but he is not where he would like to be as far as his neck pain is concerned.  He did recently have a left carpal tunnel release with Dr. April Real and aside from some minor issues with the incision healing, he feels very much better in regards to his numbness and tingling in his hand.     Plan:  He is still having neck stiffness and pain, and he is recovering from his carpal tunnel surgery on the left hand.  I would like to get an MRI of his cervical spine and work this up further, the  patient would like to wait until after the first of the year until he can recover fully from his carpal tunnel surgery.  He will continue to do home exercises from physical therapy and we will go ahead and submit the order for the MRI and I will date it after the first of the year.  We can get that done and he can come back to see me when he is ready.

## 2023-12-12 DIAGNOSIS — G56.02 CARPAL TUNNEL SYNDROME OF LEFT WRIST: Primary | ICD-10-CM

## 2023-12-12 RX ORDER — SILVER SULFADIAZINE 10 G/1000G
CREAM TOPICAL DAILY
Qty: 50 G | Refills: 1 | Status: SHIPPED | OUTPATIENT
Start: 2023-12-12 | End: 2024-02-21 | Stop reason: ALTCHOICE

## 2023-12-22 ENCOUNTER — OFFICE VISIT (OUTPATIENT)
Dept: ORTHOPEDIC SURGERY | Facility: CLINIC | Age: 63
End: 2023-12-22
Payer: MEDICARE

## 2023-12-22 DIAGNOSIS — G56.02 CARPAL TUNNEL SYNDROME OF LEFT WRIST: Primary | ICD-10-CM

## 2023-12-22 PROCEDURE — 1159F MED LIST DOCD IN RCRD: CPT | Performed by: STUDENT IN AN ORGANIZED HEALTH CARE EDUCATION/TRAINING PROGRAM

## 2023-12-22 PROCEDURE — 1160F RVW MEDS BY RX/DR IN RCRD: CPT | Performed by: STUDENT IN AN ORGANIZED HEALTH CARE EDUCATION/TRAINING PROGRAM

## 2023-12-22 PROCEDURE — 99024 POSTOP FOLLOW-UP VISIT: CPT | Performed by: STUDENT IN AN ORGANIZED HEALTH CARE EDUCATION/TRAINING PROGRAM

## 2023-12-22 PROCEDURE — 1125F AMNT PAIN NOTED PAIN PRSNT: CPT | Performed by: STUDENT IN AN ORGANIZED HEALTH CARE EDUCATION/TRAINING PROGRAM

## 2023-12-22 ASSESSMENT — PAIN SCALES - GENERAL: PAINLEVEL_OUTOF10: 0 - NO PAIN

## 2023-12-22 ASSESSMENT — PAIN - FUNCTIONAL ASSESSMENT: PAIN_FUNCTIONAL_ASSESSMENT: 0-10

## 2023-12-22 NOTE — PROGRESS NOTES
S/p LCTR.  5 weeks.  Wound significantly improved today.     Physical Examination:  The patient appears to be their stated age, is in no apparent distress, and is oriented x3. The patients mood and affect are appropriate. The patients gait is normal. The examination of the limb in question was performed in comparison to the contralateral limb.    Healed left carpal tunnel wound  AIN, PIN, ulnar intact  SILT A/R/U/M  Hand wwp      Assessment:  5 weeks post op    Plan:   He is very appreciative of the Silvadene.  He can use this on other cuts at his hand.  Follow-up in 6 months.    April Real MD

## 2024-02-21 ENCOUNTER — OFFICE VISIT (OUTPATIENT)
Dept: PRIMARY CARE | Facility: CLINIC | Age: 64
End: 2024-02-21
Payer: MEDICARE

## 2024-02-21 ENCOUNTER — HOSPITAL ENCOUNTER (OUTPATIENT)
Dept: RADIOLOGY | Facility: CLINIC | Age: 64
Discharge: HOME | End: 2024-02-21
Payer: MEDICARE

## 2024-02-21 VITALS
DIASTOLIC BLOOD PRESSURE: 96 MMHG | RESPIRATION RATE: 17 BRPM | SYSTOLIC BLOOD PRESSURE: 140 MMHG | HEART RATE: 84 BPM | WEIGHT: 225 LBS | HEIGHT: 73 IN | TEMPERATURE: 97.1 F | BODY MASS INDEX: 29.82 KG/M2

## 2024-02-21 DIAGNOSIS — M54.41 CHRONIC BILATERAL LOW BACK PAIN WITH BILATERAL SCIATICA: Primary | ICD-10-CM

## 2024-02-21 DIAGNOSIS — M54.42 CHRONIC BILATERAL LOW BACK PAIN WITH BILATERAL SCIATICA: ICD-10-CM

## 2024-02-21 DIAGNOSIS — M54.42 CHRONIC BILATERAL LOW BACK PAIN WITH BILATERAL SCIATICA: Primary | ICD-10-CM

## 2024-02-21 DIAGNOSIS — M54.41 CHRONIC BILATERAL LOW BACK PAIN WITH BILATERAL SCIATICA: ICD-10-CM

## 2024-02-21 DIAGNOSIS — G89.29 CHRONIC BILATERAL LOW BACK PAIN WITH BILATERAL SCIATICA: Primary | ICD-10-CM

## 2024-02-21 DIAGNOSIS — G89.29 CHRONIC BILATERAL LOW BACK PAIN WITH BILATERAL SCIATICA: ICD-10-CM

## 2024-02-21 PROCEDURE — 72100 X-RAY EXAM L-S SPINE 2/3 VWS: CPT | Performed by: RADIOLOGY

## 2024-02-21 PROCEDURE — 99213 OFFICE O/P EST LOW 20 MIN: CPT | Performed by: FAMILY MEDICINE

## 2024-02-21 PROCEDURE — 72100 X-RAY EXAM L-S SPINE 2/3 VWS: CPT

## 2024-02-21 RX ORDER — ORPHENADRINE CITRATE 100 MG/1
100 TABLET, EXTENDED RELEASE ORAL 2 TIMES DAILY PRN
Qty: 60 TABLET | Refills: 0 | Status: SHIPPED | OUTPATIENT
Start: 2024-02-21 | End: 2024-03-06 | Stop reason: SDUPTHER

## 2024-02-21 RX ORDER — PREDNISONE 20 MG/1
TABLET ORAL
Qty: 18 TABLET | Refills: 0 | Status: SHIPPED | OUTPATIENT
Start: 2024-02-21 | End: 2024-04-19 | Stop reason: ALTCHOICE

## 2024-02-21 ASSESSMENT — ENCOUNTER SYMPTOMS
WEAKNESS: 0
FATIGUE: 0
SHORTNESS OF BREATH: 0
BACK PAIN: 1
NUMBNESS: 0
MYALGIAS: 1
HEADACHES: 0
ARTHRALGIAS: 1
DIZZINESS: 0
BRUISES/BLEEDS EASILY: 0
CONSTITUTIONAL NEGATIVE: 1
TINGLING: 0
ENDOCRINE NEGATIVE: 1
CHEST TIGHTNESS: 0

## 2024-02-21 NOTE — PROGRESS NOTES
Subjective   Patient ID: Toño Bustamante is a 63 y.o. male who presents for Back Pain.  Pt states he has seen a chiropractor and has had 4 laser treatments. It has helped some but pain has since increased.      Back Pain  This is a new problem. The current episode started more than 1 month ago. The problem occurs constantly. The problem has been gradually worsening since onset. The pain is present in the gluteal, lumbar spine and thoracic spine. The quality of the pain is described as aching, burning, stabbing and shooting. The pain radiates to the left thigh and right thigh. The pain is at a severity of 10/10. The pain is severe. The pain is The same all the time. The symptoms are aggravated by bending, lying down, sitting, standing and twisting. Stiffness is present All day. Pertinent negatives include no chest pain, headaches, numbness, tingling or weakness. He has tried heat and ice for the symptoms. The treatment provided mild relief.       Review of Systems   Constitutional: Negative.  Negative for fatigue.   Respiratory:  Negative for chest tightness and shortness of breath.    Cardiovascular:  Negative for chest pain.   Endocrine: Negative.    Musculoskeletal:  Positive for arthralgias, back pain and myalgias.   Neurological:  Negative for dizziness, tingling, weakness, numbness and headaches.   Hematological:  Does not bruise/bleed easily.       Objective   Physical Exam  Constitutional:       Appearance: Normal appearance.   Cardiovascular:      Rate and Rhythm: Normal rate and regular rhythm.   Pulmonary:      Effort: Pulmonary effort is normal.      Breath sounds: Normal breath sounds.   Musculoskeletal:      Lumbar back: Tenderness present.   Lymphadenopathy:      Cervical: No cervical adenopathy.   Neurological:      General: No focal deficit present.      Mental Status: He is alert and oriented to person, place, and time.   Psychiatric:         Mood and Affect: Mood normal.       Assessment/Plan    Problem List Items Addressed This Visit    None  Visit Diagnoses         Codes    Chronic bilateral low back pain with bilateral sciatica    -  Primary M54.42, M54.41, G89.29    Relevant Medications    predniSONE (Deltasone) 20 mg tablet    orphenadrine (Norflex) 100 mg 12 hr tablet    Other Relevant Orders    XR lumbar spine 2-3 views    Follow Up In [facility] - PCP - Established                 Maureen Brown CMA 02/21/24 11:14 AM

## 2024-02-23 ENCOUNTER — HOSPITAL ENCOUNTER (OUTPATIENT)
Dept: RADIOLOGY | Facility: CLINIC | Age: 64
Discharge: HOME | End: 2024-02-23
Payer: MEDICARE

## 2024-02-23 ENCOUNTER — TELEPHONE (OUTPATIENT)
Dept: PRIMARY CARE | Facility: CLINIC | Age: 64
End: 2024-02-23
Payer: MEDICARE

## 2024-02-23 DIAGNOSIS — S32.040G CLOSED COMPRESSION FRACTURE OF L4 LUMBAR VERTEBRA WITH DELAYED HEALING, SUBSEQUENT ENCOUNTER: ICD-10-CM

## 2024-02-23 DIAGNOSIS — S32.040G CLOSED COMPRESSION FRACTURE OF L4 LUMBAR VERTEBRA WITH DELAYED HEALING, SUBSEQUENT ENCOUNTER: Primary | ICD-10-CM

## 2024-02-23 PROCEDURE — 72131 CT LUMBAR SPINE W/O DYE: CPT | Performed by: RADIOLOGY

## 2024-02-23 PROCEDURE — 72131 CT LUMBAR SPINE W/O DYE: CPT

## 2024-02-23 NOTE — TELEPHONE ENCOUNTER
Radiology called with a critical reading and would like for you to look at the x-ray of the lumbar spine

## 2024-02-26 ENCOUNTER — TELEPHONE (OUTPATIENT)
Dept: PRIMARY CARE | Facility: CLINIC | Age: 64
End: 2024-02-26
Payer: MEDICARE

## 2024-02-27 ENCOUNTER — PATIENT MESSAGE (OUTPATIENT)
Dept: PRIMARY CARE | Facility: CLINIC | Age: 64
End: 2024-02-27
Payer: MEDICARE

## 2024-02-27 DIAGNOSIS — M54.41 CHRONIC BILATERAL LOW BACK PAIN WITH BILATERAL SCIATICA: Primary | ICD-10-CM

## 2024-02-27 DIAGNOSIS — G89.29 CHRONIC BILATERAL LOW BACK PAIN WITH BILATERAL SCIATICA: Primary | ICD-10-CM

## 2024-02-27 DIAGNOSIS — M54.42 CHRONIC BILATERAL LOW BACK PAIN WITH BILATERAL SCIATICA: Primary | ICD-10-CM

## 2024-02-27 DIAGNOSIS — M51.36 DDD (DEGENERATIVE DISC DISEASE), LUMBAR: ICD-10-CM

## 2024-03-01 ENCOUNTER — APPOINTMENT (OUTPATIENT)
Dept: NEUROSURGERY | Facility: CLINIC | Age: 64
End: 2024-03-01
Payer: MEDICARE

## 2024-03-04 ENCOUNTER — OFFICE VISIT (OUTPATIENT)
Dept: PAIN MEDICINE | Facility: CLINIC | Age: 64
End: 2024-03-04
Payer: MEDICARE

## 2024-03-04 VITALS — RESPIRATION RATE: 20 BRPM | SYSTOLIC BLOOD PRESSURE: 166 MMHG | DIASTOLIC BLOOD PRESSURE: 114 MMHG

## 2024-03-04 DIAGNOSIS — M51.36 DDD (DEGENERATIVE DISC DISEASE), LUMBAR: ICD-10-CM

## 2024-03-04 DIAGNOSIS — M54.41 CHRONIC BILATERAL LOW BACK PAIN WITH BILATERAL SCIATICA: ICD-10-CM

## 2024-03-04 DIAGNOSIS — G89.29 CHRONIC BILATERAL LOW BACK PAIN WITH BILATERAL SCIATICA: ICD-10-CM

## 2024-03-04 DIAGNOSIS — M48.062 PSEUDOCLAUDICATION SYNDROME: Primary | ICD-10-CM

## 2024-03-04 DIAGNOSIS — M54.42 CHRONIC BILATERAL LOW BACK PAIN WITH BILATERAL SCIATICA: ICD-10-CM

## 2024-03-04 PROCEDURE — 1125F AMNT PAIN NOTED PAIN PRSNT: CPT | Performed by: PAIN MEDICINE

## 2024-03-04 PROCEDURE — G2211 COMPLEX E/M VISIT ADD ON: HCPCS | Performed by: PAIN MEDICINE

## 2024-03-04 PROCEDURE — 99214 OFFICE O/P EST MOD 30 MIN: CPT | Performed by: PAIN MEDICINE

## 2024-03-04 PROCEDURE — 99204 OFFICE O/P NEW MOD 45 MIN: CPT | Performed by: PAIN MEDICINE

## 2024-03-04 PROCEDURE — 1159F MED LIST DOCD IN RCRD: CPT | Performed by: PAIN MEDICINE

## 2024-03-04 PROCEDURE — 1160F RVW MEDS BY RX/DR IN RCRD: CPT | Performed by: PAIN MEDICINE

## 2024-03-04 RX ORDER — GABAPENTIN 300 MG/1
300 CAPSULE ORAL 3 TIMES DAILY
Qty: 90 CAPSULE | Refills: 2 | Status: SHIPPED | OUTPATIENT
Start: 2024-03-04 | End: 2024-04-19 | Stop reason: ALTCHOICE

## 2024-03-04 ASSESSMENT — PAIN SCALES - GENERAL
PAINLEVEL: 10-WORST PAIN EVER
PAINLEVEL_OUTOF10: 10 - WORST POSSIBLE PAIN

## 2024-03-04 ASSESSMENT — PAIN - FUNCTIONAL ASSESSMENT: PAIN_FUNCTIONAL_ASSESSMENT: 0-10

## 2024-03-04 NOTE — PROGRESS NOTES
Subjective   Toño Bustamante is a 63 y.o. male who presents for evaluation of his back pain.  The pain is located in the lumbar area and radiates down to his hips and down his legs to his feet..  The pain started in January but the last few weeks have progressively gotten worse.   He was told that is was from an old fracture over a year ago.     Past Medical History:   Diagnosis Date    Carcinoma of soft tissue of arm, right (CMS/HCC)     amputation with chemo    Constipation     Osteoarthritis     Psoriasis     Wears glasses      Past Surgical History:   Procedure Laterality Date    ARM AMPUTATION AT SHOULDER Right     COLONOSCOPY      HERNIA REPAIR Right     inguinal    LUNG SURGERY      nodules    TOTAL KNEE ARTHROPLASTY Bilateral        I have reviewed the nurses notes and am aware of family/social history.     Review of Systems    Objective   Physical Exam    ASSESSMENT:     PLAN:   Discussed treatment plan with patient.   Call or return to clinic prn if these symptoms worsen or fail to improve as anticipated.     Clarissa Banks RN

## 2024-03-04 NOTE — H&P
History Of Present Illness  Toño Bustamante is a 63 y.o. male presenting for evaluation of his back pain.  The pain is located in the lumbar area and radiates down to his hips and down his legs to his feet..  The pain started in January but the last few weeks have progressively gotten worse.   He was told that is was from an old fracture over a year ago.   Was seen by a chiropractor last the treatment was last Friday and he was also treated with the TENS unit with no significant improvement has been on Tylenol and ibuprofen and recently was given a Medrol Dosepak with no significant improvement.  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    Pain Assessment as of today    Pain Assessment   Pain Assessment 0-10   Pain Score 10 - Worst possible pain   Pain Type Chronic pain   Pain Location Back   Pain Orientation Lower   Pain Radiating Towards into bilateral hips and down into his feet   Pain Descriptors Aching, Heaviness, Tingling, Spasm   Pain Frequency Constant/continuous   Pain Onset Ongoing     Patient Active Problem List   Diagnosis    Bruise    Cerumen impaction    Chest pain    Chronic constipation    Constipation    DJD (degenerative joint disease), cervical    Excessive sweating    Inguinal hernia    Left shoulder pain    Leukocytosis    Localized osteoarthritis of left hand    Neck pain    Nodule of soft tissue    Primary osteoarthritis of left shoulder    Rotator cuff tear    Soft tissue sarcoma (CMS/HCC)    Urinary frequency    Tinea versicolor    Carpal tunnel syndrome, left upper limb          Past Medical History  Past Medical History:   Diagnosis Date    Carcinoma of soft tissue of arm, right (CMS/HCC)     amputation with chemo    Constipation      Osteoarthritis     Psoriasis     Wears glasses        Surgical History  Past Surgical History:   Procedure Laterality Date    ARM AMPUTATION AT SHOULDER Right     COLONOSCOPY      HERNIA REPAIR Right     inguinal    LUNG SURGERY      nodules    TOTAL KNEE ARTHROPLASTY Bilateral         Social History  He reports that he has been smoking cigarettes. He has a 22.50 pack-year smoking history. He has never used smokeless tobacco. He reports current alcohol use. He reports that he does not use drugs.    Family History  No family history on file.     Allergies  Clobetasol, Varenicline, and Adhesive    Review of Systems   12 Systems have been reviewed as follows.   Constitutional: Fever, weight gain, weight loss, appetite change, night sweats, fatigue, chills.  Eyes : blurry, double vision, vision, loss, tearing, redness, pain, sensitivity to light, glaucoma.  Ears, nose, mouth, and throat: Hearing loss, ringing in the ears, ear pain, nasal congestion, nasal drainage, nosebleeds, mouth, throat, irritation tooth problem.  Cardiovascular :chest pain, pressure, heart tracing,palpaitations , sweating, leg swelling, high or low blood pressure  Pulmonary: Cough, yellow or green sputum, blood and sputum, shortness of breath, wheezing  Gastrointestinal: Nause, vomiting, diarrhea, constipation, pain, blood in stool, or vomitus, heartburn, difficulty swallowing  Genitourinary: incontinence, abnormal bleeding, abnormal discharge, urinary frequency, urinary hesitancy, pain, impotence sexual problem, infection, urinary retention  Musculoskeletal: Pain, stiffness, joint, redness or warmth, arthritis, back pain, weakness, muscle wasting, sprain or fracture  Neuro: Weight weakness, dizziness, change in voice, change in taste change in vision, change in hearing, loss, or change of sensation, trouble walking, balance problems coordination problems, shaking, speech problem  Endocrine , cold or heat intolerance, blood sugar problem, weight  gain or loss missed periods hot flashes, sweats, change in body hair, change in libido, increased thirst, increased urination  Heme/lymph: Swelling, bleeding, problem anemia, bruising, enlarged lymph nodes  Allergic/immunologic: H. plus nasal drip, watery itchy eyes, nasal drainage, immunosuppressed  The above, were reviewed and noted negative except as noted.     Physical Exam   Vital signs reviewed, documented in chart     General:  Appears well, does not look in any major distress  Alert    HEENT:  Head atraumatic  Eyes normal inspection  PERRL  Normal ENT inspection  No signs of dehydration    NECK:  Normal inspection  Range of motion within normal     RESPIRATORY:  No respiratory distress    CVS:  Heart rate and rhythm regular    ABDOMEN/GI  Soft  Non-tender  No distention  No organomegaly      BACK:  Normal inspection, flexion and extension within normal limit   no tenderness upon the palpation of the facet joint  Si joints none tender to palpations     EXTREMITIES:  Non-Tender  Full ROM s/p amputation of right arm  No Pedal edema  Power symmetrical except EHL on the left weak and unable to lift the great toe  , sensory examination preserved.    NEURO:  Alert and oriented X 3  CNS normal as tested without focal neurological deficit   Sensation normal  Motor ambulates with a cane with a left foot drop   reflexes absent in the lower extremities     PSYCH:  Mood normal  Affect normal    SKIN:  Color normal  No rash  Warm  Dry  no sign of skin marking supportive of IV drug usage /abuse.     Last Recorded Vitals  Blood pressure (!) 166/114, resp. rate 20.    Relevant Results        CT lumbar spine wo IV contrast    Result Date: 2/23/2024  Interpreted By:  Eladio Winkler, STUDY: CT LUMBAR SPINE WO IV CONTRAST  2/23/2024 2:14 pm   INDICATION: Signs/Symptoms:L4 compression fracture   COMPARISON: Lumbar spine radiographs dated 02/21/2024.   ACCESSION NUMBER(S): [id number]   ORDERING CLINICIAN: CLAUDIO GARAY    TECHNIQUE: Axial CT images of the lumbar spine are obtained. Axial, coronal and sagittal reconstructions are provided for review.   FINDINGS: Diffusely decreased bone mineralization.   Segmentation: Normal.   Fracture: No acute fracture.   Vertebral Body Heights: There is mild chronic appearing superior endplate compression deformity of L4 with no significant osseous retropulsion. Remaining lumbar vertebral body heights are maintained.   Alignment:  Estimated 3 mm anterolisthesis of L4 on L5 with corresponding facet arthropathy. No spondylolysis.   Intervertebral Disc Spaces: Moderate degenerative disc height loss at L3-L4 with vacuum disc components and Schmorl's node deformity suggested involving the superior endplate of L4. remaining intervertebral disc spaces are grossly maintained.     Degenerative change:   T12-L1:  Mild disc bulge with slight ventral thecal sac narrowing but no spinal canal stenosis or substantial neural foraminal narrowing.   L1-2:  Mild disc bulge. No CT apparent spinal canal stenosis. Mild left and no substantial right neural foraminal narrowing.   L2-3:  Mild bilateral facet arthropathy. Moderate ligamentum flavum hypertrophy. Prominent dorsal epidural fat. Moderate disc bulge with right central disc protrusion component. There is right-greater-than-left lateral recess stenosis and severe spinal canal stenosis. Mild-to-moderate right and mild left neural foraminal narrowing.   L3-4:  Moderate facet arthropathy and ligamentum flavum hypertrophy. Severe disc bulge with scattered endplate spurring. Lateral recess effacement with severe spinal canal stenosis. Moderate to severe right and moderate left neural foraminal narrowing.   L4-5:  Severe bilateral facet arthropathy. Moderate ligamentum flavum hypertrophy. Moderate disc bulge/uncovering. Prominent dorsal epidural fat. Severe spinal canal stenosis. Moderate left-greater-than-right neural foraminal narrowing.   L5-S1:  Moderate left and  mild right facet arthropathy. Ligamentum flavum hypertrophy. Minimal/mild disc bulge. No spinal canal stenosis. No significant neural foraminal narrowing.   Prevertebral/Paraspinal Soft Tissues: The prevertebral and paraspinal soft tissues are unremarkable.  Moderate aortic atherosclerotic calcification. Colonic diverticulosis.       Diffusely decreased bone mineralization with no acute osseous abnormality of the lumbar spine. There is mild remote superior endplate compression deformity of L4.   Degenerative grade 1 anterolisthesis of L4 on L5.   Multilevel severe spinal canal stenosis including involvement at L2-L3, L3-L4, and L4-L5 as detailed above. Moderate to severe right and moderate left neural foraminal narrowing at L3-L4 with additional moderate left-greater-than-right neural foraminal narrowing at L4-L5.   MACRO: None   Signed by: Eladio Winkler 2/23/2024 3:38 PM Dictation workstation:   [id number]    XR lumbar spine 2-3 views    Result Date: 2/22/2024  Interpreted By:  Franc Vaughn, STUDY: XR LUMBAR SPINE 2-3 VIEWS; ;  2/21/2024 12:40 pm   INDICATION: Signs/Symptoms:back pain.   COMPARISON: None.   ACCESSION NUMBER(S): [id number]   ORDERING CLINICIAN: CLAUDIO GARAY   FINDINGS: Lumbar spine, three views   There is loss of height of the L4 vertebral body, finding of unknown chronicity. Moderate facet disease in the lower lumbar spine. Moderate multilevel disc space narrowing is fibrosis as well. No spondylolisthesis seen       Compression fracture at L4, a finding of unknown chronicity. CT can be performed for complete evaluation Moderate facet disease and spondylosis worse in the lower lumbar spine     MACRO: Critical Finding:  See findings. Notification was initiated on 2/22/2024 at 6:46 pm by  Franc Vaughn.  (**-OCF-**)   Signed by: Franc Vaughn 2/22/2024 6:46 PM Dictation workstation:   [id number]    XR cervical spine complete 4-5 views    Result Date: 11/14/2023  Interpreted By:  Huan  Raiza, STUDY: XR CERVICAL SPINE COMPLETE 4-5 VIEWS;  11/13/2023 8:20 am   INDICATION: Signs/Symptoms:pain.   COMPARISON: 02/21/2017   ACCESSION NUMBER(S): [id number]   ORDERING CLINICIAN: SAMANTHA HANDY   TECHNIQUE: AP, lateral and flexion and extension images of cervical spine obtained   FINDINGS: The patient is slightly scoliotic. There is straightening of the normal cervical lordosis. There appears to be minimal spondylolisthesis at C7-T1.   There is vertebral body endplate spurring. There is facet hypertrophy. There is disc space narrowing at multiple levels.   There is no obvious change in alignment with flexion and extension.   There is no fracture. The prevertebral soft tissues are unremarkable.   COMPARISON OF FINDINGS: The spine is similar to the prior exam.       DJD and disc disease of mid and lower cervical spine.     MACRO: none   Signed by: Raiza Pressley 11/14/2023 12:26 PM Dictation workstation:   [id number]        Assessment/Plan     63 years old with history and physical examination supportive of lumbar stenosis with neurogenic claudication lumbar spondylosis and an old compression fraction at the level of the L4 creating for him chronic pain    The most applicable treatment options considered and discussed with the patient at this time, including a combination of physical therapy approaches, psychological/psychiatric approaches, pharmacologic management, interventional procedures and pain management surgeries (such as spinal cord stimulation and intrathecal pump placement. Risk of complications and/or morbidity and mortality is high given the acute and chronic pain may pose a threat to life and bodily functions if undertreated, poorly treated or with failure to maintain adequate and timely follow-up. Given the serious and fluctuating nature of pain (with extensive considerations whenever pain changes, worsens and even if it improves), there always remains the possibility of prolonged functional  impairment requiring constant patient re-assessment and high level medical decision making. The amount and complexity of data reviewed is high given the patient labs, radiology reports and other tests were obtained and reviewed (summarized as applicable). Pertinent positive and negative findings were considered in medical decision making.     The patient was counseled regarding diagnostic results, instructions for management, risk factor reductions, prognosis, patient and family education, impressions, risks and benefits of treatment options and importance of compliance with treatment.        The level of clinical decision making in this office visit, is high, given the high risks of complications with the morbidity and mortality due to the fact that acute and chronic pain may pose a threat to the life and bodily function, if under treated, poorly treated, or with failure to maintain adequate treatment and timely medical follow up. Additionally over treatment has its own set of complications including overdosing on the pain medications and also the habit forming effects of the medications used to treat chronic painful conditions including therapeutic classes classified as dangerous medications. Given the serious and fluctuating nature of pain with extensive consideration for whenever pain changes, there is always the risk of prolonged functional impairment requiring close patient assessment and reassessment and high level medical decision making at every office visit. The amount and complexity of data reviewed is high given the patient clinical presentation, labs,  data, radiology reports, and other tests as above. Pertinent data whether positive or negative were taken in consideration in the process of making this high level medical decision.   Factors to be considered the chronicity and the severity of the symptoms and signs associated comorbidity and differential diagnosis motivation to get in treatment  response to treatment adherence to treatment recommendation and using skills.  The patient's verbal consent was obtained.  Discussed the pharmacological treatment versus the nonpharmacological treatment for current pain condition will continue the combination of the current treatment.  The typical short and long-term side effects of the proposed medication regimen including contraindications and clinically significant interactions were discussed with the patient.  Examples of side effects include but are not limited to sedation overdose with the usage of the opiate constipation mood changes sedation impaired judgment.  I advised the patient not to drive or drink while taking these medication.  The patient was advised to stop taking any medication if they have acute severe side effects and to reach out to our office with question the patient denied any contraindication to this treatment.  Targeted symptoms and signs possible therapeutic benefits side effects and risks were discussed.    Plan  Advised the patient that I would recommend initiation of physical therapy to assist him with the left foot drop and to prevent any further progression of the weakness  I advised the patient to be seen and evaluated by Dr. Allen's team to determine the need for any decompression in the lumbar spine area knowing that he have 3 levels that are with significant stenosis  Knowing that the patient pain has persisted and he had failed the usage of the medication and chiropractic manipulation I would recommend a trial of a lumbar epidural steroid injection to be performed under fluoroscopic guidance targeting the L3-L4 or the L4-L5 level with injection of contrast material to confirm needle placement I will reevaluate the patient after the performance of the epidural for further recommendation as his case progress benefits and risk from the procedure were discussed with the patient he would like to proceed    In the meantime I  recommended for the patient a slow titration of gabapentin starting at 300 mg titrated up to 3 times per day if tolerated  The above clinical summary has been dictated with voice recognition software. It has not been proofread for grammatical errors, typographical mistakes, or other semantic inconsistencies.    Thank you for visiting our office today. It was our pleasure to take part in your healthcare.     Please do not hesitate to contact the pain clinic after your visit with any questions or concerns at  M-F 8-4 pm       Brandy Keita M.D.  Medical Director , Division of Pain Medicine [facility]   of Anesthesiology and Pain Medicine  [facility] [employer]     [place] [address] [address] [place] [address]     Office: [phone]  Fax: [phone]            Brandy Keita MD

## 2024-03-06 ENCOUNTER — OFFICE VISIT (OUTPATIENT)
Dept: PRIMARY CARE | Facility: CLINIC | Age: 64
End: 2024-03-06
Payer: MEDICARE

## 2024-03-06 ENCOUNTER — LAB (OUTPATIENT)
Dept: LAB | Facility: LAB | Age: 64
End: 2024-03-06
Payer: MEDICARE

## 2024-03-06 VITALS
BODY MASS INDEX: 29.69 KG/M2 | RESPIRATION RATE: 17 BRPM | DIASTOLIC BLOOD PRESSURE: 90 MMHG | OXYGEN SATURATION: 97 % | WEIGHT: 224 LBS | TEMPERATURE: 97.6 F | HEIGHT: 73 IN | SYSTOLIC BLOOD PRESSURE: 148 MMHG | HEART RATE: 78 BPM

## 2024-03-06 DIAGNOSIS — Z13.220 LIPID SCREENING: ICD-10-CM

## 2024-03-06 DIAGNOSIS — G89.29 CHRONIC BILATERAL LOW BACK PAIN WITH BILATERAL SCIATICA: ICD-10-CM

## 2024-03-06 DIAGNOSIS — M54.42 CHRONIC BILATERAL LOW BACK PAIN WITH BILATERAL SCIATICA: ICD-10-CM

## 2024-03-06 DIAGNOSIS — Z13.6 ENCOUNTER FOR LIPID SCREENING FOR CARDIOVASCULAR DISEASE: ICD-10-CM

## 2024-03-06 DIAGNOSIS — Z12.5 PROSTATE CANCER SCREENING: ICD-10-CM

## 2024-03-06 DIAGNOSIS — R73.9 HYPERGLYCEMIA: ICD-10-CM

## 2024-03-06 DIAGNOSIS — M54.41 CHRONIC BILATERAL LOW BACK PAIN WITH BILATERAL SCIATICA: ICD-10-CM

## 2024-03-06 DIAGNOSIS — Z00.00 MEDICARE ANNUAL WELLNESS VISIT, SUBSEQUENT: Primary | ICD-10-CM

## 2024-03-06 DIAGNOSIS — H34.8322 STABLE BRANCH RETINAL VEIN OCCLUSION OF LEFT EYE (CMS-HCC): ICD-10-CM

## 2024-03-06 DIAGNOSIS — Z13.220 ENCOUNTER FOR LIPID SCREENING FOR CARDIOVASCULAR DISEASE: ICD-10-CM

## 2024-03-06 DIAGNOSIS — R53.83 FATIGUE, UNSPECIFIED TYPE: ICD-10-CM

## 2024-03-06 DIAGNOSIS — Z00.00 MEDICARE ANNUAL WELLNESS VISIT, SUBSEQUENT: ICD-10-CM

## 2024-03-06 DIAGNOSIS — Z00.00 WELL ADULT EXAM: ICD-10-CM

## 2024-03-06 LAB
ALBUMIN SERPL BCP-MCNC: 4.3 G/DL (ref 3.4–5)
ALP SERPL-CCNC: 63 U/L (ref 33–136)
ALT SERPL W P-5'-P-CCNC: 12 U/L (ref 10–52)
ANION GAP SERPL CALC-SCNC: 12 MMOL/L (ref 10–20)
AST SERPL W P-5'-P-CCNC: 13 U/L (ref 9–39)
BASOPHILS # BLD AUTO: 0.07 X10*3/UL (ref 0–0.1)
BASOPHILS NFR BLD AUTO: 0.7 %
BILIRUB SERPL-MCNC: 0.6 MG/DL (ref 0–1.2)
BUN SERPL-MCNC: 14 MG/DL (ref 6–23)
CALCIUM SERPL-MCNC: 9.3 MG/DL (ref 8.6–10.3)
CHLORIDE SERPL-SCNC: 105 MMOL/L (ref 98–107)
CHOLEST SERPL-MCNC: 146 MG/DL (ref 0–199)
CHOLESTEROL/HDL RATIO: 3
CO2 SERPL-SCNC: 24 MMOL/L (ref 21–32)
CREAT SERPL-MCNC: 0.82 MG/DL (ref 0.5–1.3)
EGFRCR SERPLBLD CKD-EPI 2021: >90 ML/MIN/1.73M*2
EOSINOPHIL # BLD AUTO: 0.27 X10*3/UL (ref 0–0.7)
EOSINOPHIL NFR BLD AUTO: 2.8 %
ERYTHROCYTE [DISTWIDTH] IN BLOOD BY AUTOMATED COUNT: 13.3 % (ref 11.5–14.5)
EST. AVERAGE GLUCOSE BLD GHB EST-MCNC: 103 MG/DL
GLUCOSE SERPL-MCNC: 83 MG/DL (ref 74–99)
HBA1C MFR BLD: 5.2 %
HCT VFR BLD AUTO: 45.7 % (ref 41–52)
HDLC SERPL-MCNC: 47.9 MG/DL
HGB BLD-MCNC: 15.7 G/DL (ref 13.5–17.5)
IMM GRANULOCYTES # BLD AUTO: 0.04 X10*3/UL (ref 0–0.7)
IMM GRANULOCYTES NFR BLD AUTO: 0.4 % (ref 0–0.9)
LDLC SERPL CALC-MCNC: 78 MG/DL
LYMPHOCYTES # BLD AUTO: 2.04 X10*3/UL (ref 1.2–4.8)
LYMPHOCYTES NFR BLD AUTO: 20.9 %
MCH RBC QN AUTO: 32.7 PG (ref 26–34)
MCHC RBC AUTO-ENTMCNC: 34.4 G/DL (ref 32–36)
MCV RBC AUTO: 95 FL (ref 80–100)
MONOCYTES # BLD AUTO: 0.81 X10*3/UL (ref 0.1–1)
MONOCYTES NFR BLD AUTO: 8.3 %
NEUTROPHILS # BLD AUTO: 6.55 X10*3/UL (ref 1.2–7.7)
NEUTROPHILS NFR BLD AUTO: 66.9 %
NON HDL CHOLESTEROL: 98 MG/DL (ref 0–149)
NRBC BLD-RTO: 0 /100 WBCS (ref 0–0)
PLATELET # BLD AUTO: 272 X10*3/UL (ref 150–450)
POTASSIUM SERPL-SCNC: 4.4 MMOL/L (ref 3.5–5.3)
PROT SERPL-MCNC: 6.6 G/DL (ref 6.4–8.2)
PSA SERPL-MCNC: 1.95 NG/ML
RBC # BLD AUTO: 4.8 X10*6/UL (ref 4.5–5.9)
SODIUM SERPL-SCNC: 137 MMOL/L (ref 136–145)
TRIGL SERPL-MCNC: 99 MG/DL (ref 0–149)
TSH SERPL-ACNC: 0.72 MIU/L (ref 0.44–3.98)
VLDL: 20 MG/DL (ref 0–40)
WBC # BLD AUTO: 9.8 X10*3/UL (ref 4.4–11.3)

## 2024-03-06 PROCEDURE — 80061 LIPID PANEL: CPT

## 2024-03-06 PROCEDURE — 80053 COMPREHEN METABOLIC PANEL: CPT

## 2024-03-06 PROCEDURE — 84443 ASSAY THYROID STIM HORMONE: CPT

## 2024-03-06 PROCEDURE — 36415 COLL VENOUS BLD VENIPUNCTURE: CPT

## 2024-03-06 PROCEDURE — G0439 PPPS, SUBSEQ VISIT: HCPCS | Performed by: FAMILY MEDICINE

## 2024-03-06 PROCEDURE — 85025 COMPLETE CBC W/AUTO DIFF WBC: CPT

## 2024-03-06 PROCEDURE — G0103 PSA SCREENING: HCPCS

## 2024-03-06 PROCEDURE — 83036 HEMOGLOBIN GLYCOSYLATED A1C: CPT

## 2024-03-06 PROCEDURE — 99397 PER PM REEVAL EST PAT 65+ YR: CPT | Performed by: FAMILY MEDICINE

## 2024-03-06 RX ORDER — ORPHENADRINE CITRATE 100 MG/1
100 TABLET, EXTENDED RELEASE ORAL 2 TIMES DAILY PRN
Qty: 60 TABLET | Refills: 3 | Status: SHIPPED | OUTPATIENT
Start: 2024-03-06 | End: 2024-05-14 | Stop reason: HOSPADM

## 2024-03-06 ASSESSMENT — ACTIVITIES OF DAILY LIVING (ADL)
MANAGING_FINANCES: INDEPENDENT
TAKING_MEDICATION: INDEPENDENT
BATHING: INDEPENDENT
GROCERY_SHOPPING: INDEPENDENT
DOING_HOUSEWORK: INDEPENDENT
DRESSING: INDEPENDENT

## 2024-03-06 ASSESSMENT — ENCOUNTER SYMPTOMS
POLYDIPSIA: 0
DIARRHEA: 0
MYALGIAS: 1
APPETITE CHANGE: 0
WEAKNESS: 0
CHEST TIGHTNESS: 0
FREQUENCY: 0
CONSTITUTIONAL NEGATIVE: 1
FATIGUE: 0
BACK PAIN: 1
BRUISES/BLEEDS EASILY: 0
NUMBNESS: 0
HEADACHES: 0
SHORTNESS OF BREATH: 0
ARTHRALGIAS: 1
VOMITING: 0
POLYPHAGIA: 0
DIZZINESS: 0
ENDOCRINE NEGATIVE: 1
NAUSEA: 0

## 2024-03-06 ASSESSMENT — PATIENT HEALTH QUESTIONNAIRE - PHQ9
7. TROUBLE CONCENTRATING ON THINGS, SUCH AS READING THE NEWSPAPER OR WATCHING TELEVISION: NOT AT ALL
8. MOVING OR SPEAKING SO SLOWLY THAT OTHER PEOPLE COULD HAVE NOTICED. OR THE OPPOSITE, BEING SO FIGETY OR RESTLESS THAT YOU HAVE BEEN MOVING AROUND A LOT MORE THAN USUAL: NOT AT ALL
SUM OF ALL RESPONSES TO PHQ QUESTIONS 1-9: 5
4. FEELING TIRED OR HAVING LITTLE ENERGY: SEVERAL DAYS
2. FEELING DOWN, DEPRESSED OR HOPELESS: MORE THAN HALF THE DAYS
3. TROUBLE FALLING OR STAYING ASLEEP OR SLEEPING TOO MUCH: NOT AT ALL
6. FEELING BAD ABOUT YOURSELF - OR THAT YOU ARE A FAILURE OR HAVE LET YOURSELF OR YOUR FAMILY DOWN: NOT AT ALL
SUM OF ALL RESPONSES TO PHQ9 QUESTIONS 1 AND 2: 4
9. THOUGHTS THAT YOU WOULD BE BETTER OFF DEAD, OR OF HURTING YOURSELF: NOT AT ALL
1. LITTLE INTEREST OR PLEASURE IN DOING THINGS: MORE THAN HALF THE DAYS
5. POOR APPETITE OR OVEREATING: NOT AT ALL
10. IF YOU CHECKED OFF ANY PROBLEMS, HOW DIFFICULT HAVE THESE PROBLEMS MADE IT FOR YOU TO DO YOUR WORK, TAKE CARE OF THINGS AT HOME, OR GET ALONG WITH OTHER PEOPLE: EXTREMELY DIFFICULT

## 2024-03-06 NOTE — PROGRESS NOTES
"Subjective   Reason for Visit: Toño Bustamante is an 63 y.o. male here for a Medicare Wellness visit.     Past Medical, Surgical, and Family History reviewed and updated in chart.    Reviewed all medications by prescribing practitioner or clinical pharmacist (such as prescriptions, OTCs, herbal therapies and supplements) and documented in the medical record.    HPI    Patient Care Team:  José Manuel Brunner DO as PCP - General  José Manuel Brunner DO as PCP - [employer] PCP     Review of Systems   Constitutional: Negative.  Negative for appetite change and fatigue.   Eyes:  Negative for visual disturbance.   Respiratory:  Negative for chest tightness and shortness of breath.    Cardiovascular:  Negative for chest pain and leg swelling.   Gastrointestinal:  Negative for diarrhea, nausea and vomiting.   Endocrine: Negative.  Negative for polydipsia, polyphagia and polyuria.   Genitourinary:  Negative for frequency and urgency.   Musculoskeletal:  Positive for arthralgias, back pain, gait problem and myalgias.   Neurological:  Negative for dizziness, syncope, weakness, numbness and headaches.   Hematological:  Does not bruise/bleed easily.       Objective   Vitals:  /90   Pulse 78   Temp 36.4 °C (97.6 °F)   Resp 17   Ht 1.854 m (6' 1\")   Wt 102 kg (224 lb)   SpO2 97%   BMI 29.55 kg/m²       Physical Exam  Constitutional:       Appearance: Normal appearance.   HENT:      Head: Normocephalic.      Right Ear: Tympanic membrane, ear canal and external ear normal.      Left Ear: Tympanic membrane, ear canal and external ear normal.      Nose: Nose normal.      Mouth/Throat:      Mouth: Mucous membranes are moist.      Pharynx: Oropharynx is clear.   Eyes:      Conjunctiva/sclera: Conjunctivae normal.   Cardiovascular:      Rate and Rhythm: Normal rate and regular rhythm.   Pulmonary:      Effort: Pulmonary effort is normal.      Breath sounds: Normal breath sounds.   Musculoskeletal:      Cervical " back: Neck supple.      Thoracic back: Spasms and tenderness present. Decreased range of motion.      Lumbar back: Tenderness present. Decreased range of motion.   Lymphadenopathy:      Cervical: No cervical adenopathy.   Skin:     General: Skin is warm and dry.   Neurological:      General: No focal deficit present.      Mental Status: He is alert and oriented to person, place, and time.   Psychiatric:         Mood and Affect: Mood normal.       Assessment/Plan   Problem List Items Addressed This Visit    None  Visit Diagnoses       Chronic bilateral low back pain with bilateral sciatica

## 2024-03-07 ENCOUNTER — APPOINTMENT (OUTPATIENT)
Dept: PHYSICAL THERAPY | Facility: CLINIC | Age: 64
End: 2024-03-07
Payer: MEDICARE

## 2024-03-08 ENCOUNTER — EVALUATION (OUTPATIENT)
Dept: PHYSICAL THERAPY | Facility: CLINIC | Age: 64
End: 2024-03-08
Payer: MEDICARE

## 2024-03-08 DIAGNOSIS — M54.42 CHRONIC BILATERAL LOW BACK PAIN WITH BILATERAL SCIATICA: ICD-10-CM

## 2024-03-08 DIAGNOSIS — M51.36 DDD (DEGENERATIVE DISC DISEASE), LUMBAR: ICD-10-CM

## 2024-03-08 DIAGNOSIS — G89.29 CHRONIC BILATERAL LOW BACK PAIN WITH BILATERAL SCIATICA: ICD-10-CM

## 2024-03-08 DIAGNOSIS — M54.41 CHRONIC BILATERAL LOW BACK PAIN WITH BILATERAL SCIATICA: ICD-10-CM

## 2024-03-08 PROCEDURE — 97161 PT EVAL LOW COMPLEX 20 MIN: CPT | Mod: GP

## 2024-03-08 PROCEDURE — 97110 THERAPEUTIC EXERCISES: CPT | Mod: GP

## 2024-03-08 PROCEDURE — 97535 SELF CARE MNGMENT TRAINING: CPT | Mod: GP

## 2024-03-08 ASSESSMENT — ENCOUNTER SYMPTOMS
LOSS OF SENSATION IN FEET: 0
DEPRESSION: 0
OCCASIONAL FEELINGS OF UNSTEADINESS: 1

## 2024-03-08 NOTE — PROGRESS NOTES
"Patient Name: Toño Bustamante  MRN: [id number]  Today's Date: 3/8/2024  Visit #1     Subjective:  Chief Complaint: Benji is a 63 y.o. M who presents to therapy c/o chronic LBP that travels to B glutes and down B lateral LE. Pain, numbness/tingling and weakness worse in the morning. He also notes frequent \"muscle spasms\" in B LE that last for \"a few seconds.\" Notes a long history of LBP but symptoms began insidiously began in December and have progressively gotten worse. He has seen Chiropractic care and Pain Medicine to help manage his symptoms. Currently awaiting approval for epidural in April. He presents to therapy utilizing a single-point pain with his L UE. He denies any: unexplained weight loss, fever/chills, saddle anesthesia. He does note recent changes in bladder function but denies any bowel changes. Adds that his PCP and Pain Medicine physician are aware.     Home Setup/responsibilities: Helps take care of his mother during lunch each day - helping her eat and clean. Lives at home with his wife - who assists w/ cooking, cleaning, and bathing. Largest obstacle at home is negotiating 13 stairs.    Pain intensity at rest: 0/10  Pain intensity at worst: 12/10 - worse in morning  Alleviating factors: \"bending forward helps\"  Aggravating factors: Walking  PLOF: Able to walk >1 mile per day - uninhibited by pain  PMH: R shoulder disarticulation, B TKA  Occupation: Retired  Therapy Goals: \"To be able to walk\"    Objective: Exam limited d/t high irritability and position tolerance  Lumbar ROM  Flexion: No loss  Extension: <25% loss - \"a little pain\"  Side bend (L/R): 25%, B  Rotation (L/R): 25%, B    Repeated Motions    Flexion: No change - \"a little better\"  Extension: No change   Side bend (L/R): No change, No change    Hip ROM (L/R)  Flexion: 115°/115°  Abduction: 45°/45°  Extension: 15°/15°  External Rotation: 40°/40°  Internal rotation: 15°/15°    Specific Lower Extremity MMT (L/R)  Iliopsoas: 3+/5, " 4-/5  Gluteus Hemant (supine): 4-/5, 4-/5  Hip External Rotation: 3+/5, 4-/5  Gluteus Medius: 3+/5, 3+/5    DTR (L/R)  Patellar L4: 2+/2+  Achilles S1: 2+/2+    Dermatomes intact BLE    Special Tests  Slump: + R>L  SLR: + on R @ 60deg  5xSTS: 21.6s    Knee ROM (L/R)  Flex: 100 / 90  Ext: +2 / +1    Modified DIANE: 46%    Treatment:  Therapeutic Exercises: HEP provided: Completed in session  *LTR  *Bridges  *Sit-to-stand w/ counter support  *Daily walking/mobility: every 60 minutes    Self-care/home training: Gradual progression of exercise, importance of mobility vs sedentary behaviors, DN handout, neural inflammation, stair negotiation.     Assessment:   Benji presents to therapy w/ c/o chronic LBP with radicular symptoms down the lateral aspects of B LE. He presents with global strength and ROM deficits as a result increased sedentary behaviors 2/2 high pain irritability. Pt is likely to benefit from skilled interventions to address their impairments, and treatment that includes: manual techniques to decrease pain and improve joint/soft-tissue mobility, as well as exercises to increase strength, endurance, and neuromotor control.    Standardized testing and measures administered today reveal that the patient has multiple impairments in body structures and functions, activity limitations, and participation restrictions. The patient has 2 personal factors and comorbidities that may serve as barriers affecting the plan of care: mobility deficits and fear-avoidance behaviors. The patient's clinical presentation includes stable & uncomplicated characteristics as noted during today's evaluation, & these findings indicate that this patient is of low complexity.  Skilled PT services are warranted in order to realize measurable change in the above outcome measures and achieve improvements in the patient's functional status and individual goals.    Plan:   Planned interventions include: dry needling, education/instruction,  manual therapy, neuromuscular re-education, self care/home management, therapeutic activities and therapeutic exercises.   Potential to achieve rehab goals: fair-good  Goals:   Demonstrate independence with home exercise program  Tolerate increased exercise without adverse reaction  Demonstrate improved posture & proper body mechanics throughout session  Increase lumbar ROM to pain free + WNL  Increase B LE strength to pain free + WNL  Report decrease in pain by > 2 points to meet the MCID  Decrease score of Modified DIANE by > 6 points to meet the MCID  Decrease time on 5x sit to stand test by > 2.3 sec to meet the MCID  Perform ADLs without an increase symptoms  Ascend / descend stairs without an increase in symptoms  Ambulate 1 mile without an increase in symptoms

## 2024-03-08 NOTE — Clinical Note
March 8, 2024    Wesley Ryder PT  [address]  [address]  [facility] [address]    Patient: Toño Bustamante   YOB: 1960   Date of Visit: 3/8/2024       Dear José Manuel Brunner DO  [address]  [address]  [place] [address]    The attached plan of care is being sent to you because your patient’s medical reimbursement requires that you certify the plan of care. Your signature is required to allow uninterrupted insurance coverage.      You may indicate your approval by signing below and faxing this form back to us at Dept Fax: [phone].    Please call Dept: [phone] with any questions or concerns.    Thank you for this referral,        Wesley Ryder PT  [address] [facility]  [address] [facility]  [place] [address]    Payer: Payor: [employer] / Plan: [employer] / Product Type: *No Product type* /                                                                         Date:     Dear Wesley Ryder PT,     Re: Mr. Toño Bustamante, MRN:[id number]    I certify that I have reviewed the attached plan of care and it is medically necessary for Mr. Toño Bustamante (1960) who is under my care.          ______________________________________                    _________________  Provider name and credentials                                           Date and time                                                                                           Plan of Care 3/8/24   Effective from: 3/8/2024  Effective to: 9/10/2024    Plan ID: [id number]            Participants as of Finalize on 3/8/2024    Name Type Comments Contact Info    José Manuel Brunner DO PCP - [employer] PCP  [phone]    Wesley Ryder PT Physical Therapist  [phone]       Last Plan Note     Author: Wesley Ryder PT Status: Incomplete Last edited: 3/8/2024 10:45 AM       Patient Name: Toño Bustamante  MRN: [id number]  Today's Date:  "3/8/2024  Visit #1     Subjective:  Chief Complaint: Benji is a 63 y.o. M who presents to therapy c/o chronic LBP that travels to B glutes and down B lateral LE. Pain, numbness/tingling and weakness worse in the morning. He also notes frequent \"muscle spasms\" in B LE that last for \"a few seconds.\" Notes a long history of LBP but symptoms began insidiously began in December and have progressively gotten worse. He has seen Chiropractic care and Pain Medicine to help manage his symptoms. Currently awaiting approval for epidural in April. He presents to therapy utilizing a single-point pain with his L UE. He denies any: unexplained weight loss, fever/chills, saddle anesthesia. He does note recent changes in bladder function but denies any bowel changes. Adds that his PCP and Pain Medicine physician are aware.     Home Setup/responsibilities: Helps take care of his mother during lunch each day - helping her eat and clean. Lives at home with his wife - who assists w/ cooking, cleaning, and bathing. Largest obstacle at home is negotiating 13 stairs.    Pain intensity at rest: 0/10  Pain intensity at worst: 12/10 - worse in morning  Alleviating factors: \"bending forward helps\"  Aggravating factors: Walking  PLOF: Able to walk >1 mile per day - uninhibited by pain  PMH: R shoulder disarticulation, B TKA  Occupation: Retired  Therapy Goals: \"To be able to walk\"    Objective: Exam limited d/t high irritability and position tolerance  Lumbar ROM  Flexion: No loss  Extension: <25% loss - \"a little pain\"  Side bend (L/R): 25%, B  Rotation (L/R): 25%, B    Repeated Motions    Flexion: No change - \"a little better\"  Extension: No change   Side bend (L/R): No change, No change    Hip ROM (L/R)  Flexion: 115°/115°  Abduction: 45°/45°  Extension: 15°/15°  External Rotation: 40°/40°  Internal rotation: 15°/15°    Specific Lower Extremity MMT (L/R)  Iliopsoas: 3+/5, 4-/5  Gluteus Hemant (supine): 4-/5, 4-/5  Hip External Rotation: 3+/5, " 4-/5  Gluteus Medius: 3+/5, 3+/5    DTR (L/R)  Patellar L4: 2+/2+  Achilles S1: 2+/2+    Dermatomes intact BLE    Special Tests  Slump: + R>L  SLR: + on R @ 60deg  5xSTS: 21.6s    Knee ROM (L/R)  Flex: 100 / 90  Ext: +2 / +1    Modified DIANE: 46%    Treatment:  Therapeutic Exercises: HEP provided: Completed in session  *LTR  *Bridges  *Sit-to-stand w/ counter support  *Daily walking/mobility: every 60 minutes    Self-care/home training: Gradual progression of exercise, importance of mobility vs sedentary behaviors, DN handout, neural inflammation, stair negotiation.     Assessment:   Benji presents to therapy w/ c/o chronic LBP with radicular symptoms down the lateral aspects of B LE. He presents with global strength and ROM deficits as a result increased sedentary behaviors 2/2 high pain irritability. Pt is likely to benefit from skilled interventions to address their impairments, and treatment that includes: manual techniques to decrease pain and improve joint/soft-tissue mobility, as well as exercises to increase strength, endurance, and neuromotor control.    Standardized testing and measures administered today reveal that the patient has multiple impairments in body structures and functions, activity limitations, and participation restrictions. The patient has 2 personal factors and comorbidities that may serve as barriers affecting the plan of care: mobility deficits and fear-avoidance behaviors. The patient's clinical presentation includes stable & uncomplicated characteristics as noted during today's evaluation, & these findings indicate that this patient is of low complexity.  Skilled PT services are warranted in order to realize measurable change in the above outcome measures and achieve improvements in the patient's functional status and individual goals.    Plan:   Planned interventions include: dry needling, education/instruction, manual therapy, neuromuscular re-education, self care/home management,  therapeutic activities and therapeutic exercises.   Potential to achieve rehab goals: fair-good  Goals:   Demonstrate independence with home exercise program  Tolerate increased exercise without adverse reaction  Demonstrate improved posture & proper body mechanics throughout session  Increase lumbar ROM to pain free + WNL  Increase B LE strength to pain free + WNL  Report decrease in pain by > 2 points to meet the MCID  Decrease score of Modified DIANE by > 6 points to meet the MCID  Decrease time on 5x sit to stand test by > 2.3 sec to meet the MCID  Perform ADLs without an increase symptoms  Ascend / descend stairs without an increase in symptoms  Ambulate 1 mile without an increase in symptoms               Current Participants as of 3/8/2024    Name Type Comments Contact Info    José Manuel Brunner DO PCP - [employer] PCP  [phone]    Signature pending    Wesley Ryder PT Physical Therapist  [phone]    Signature pending

## 2024-03-12 ENCOUNTER — TREATMENT (OUTPATIENT)
Dept: PHYSICAL THERAPY | Facility: CLINIC | Age: 64
End: 2024-03-12
Payer: MEDICARE

## 2024-03-12 DIAGNOSIS — M54.42 CHRONIC BILATERAL LOW BACK PAIN WITH BILATERAL SCIATICA: ICD-10-CM

## 2024-03-12 DIAGNOSIS — M54.41 CHRONIC BILATERAL LOW BACK PAIN WITH BILATERAL SCIATICA: ICD-10-CM

## 2024-03-12 DIAGNOSIS — G89.29 CHRONIC BILATERAL LOW BACK PAIN WITH BILATERAL SCIATICA: ICD-10-CM

## 2024-03-12 DIAGNOSIS — M51.36 DDD (DEGENERATIVE DISC DISEASE), LUMBAR: ICD-10-CM

## 2024-03-12 PROCEDURE — 97140 MANUAL THERAPY 1/> REGIONS: CPT | Mod: GP

## 2024-03-12 NOTE — PROGRESS NOTES
"Physical Therapy Treatment      Patient Name: Toño Bustamante  MRN: [id number]  Today's Date: 3/12/2024  Visit #2     Insurance:  Visit Limit: bmn - 25 authorized   Co-Pay: $20    Assessment:  Benji presents to therapy w/ slight improvements overall but moderate LE muscle spasms today. Spent a majority of today's session on manual w/ increased time needed for position transition d/t high symptom irritability. He reported improvements in muscle spasms upon completion today but no change in pain. Verbally reviewed HEP - regressing glute bridges d/t pain/difficulty.     Patient's response to session: No change in pain and Increased knowledge and understanding    Patient is likely to benefit from skilled interventions to address their impairments, and treatment that includes: manual techniques to decrease pain and improve joint/soft-tissue mobility, as well as exercises to increase strength, endurance, and neuromotor control.     Plan:  Continue per POC.    Current Problem:   1. Chronic bilateral low back pain with bilateral sciatica  Follow Up In Physical Therapy      2. DDD (degenerative disc disease), lumbar  Follow Up In Physical Therapy          Subjective   Benji notes that he was feeling \"a little better\" since last session. He notes today has been \"a really bad day.\" He has been experiencing intermittent B LE \"spasms\" for the past 3 hours.     Objective   Gait: Decreased step length    Treatments:    Therapeutic Exercise (63630):  HEP reviewed verbally  *LTR  *Bridges - glute set regression   *Sit-to-stand w/ counter support  *Sciatic nerve glides  *Daily walking/mobility: every 60 minutes    Manual Therapy (03227):  B hip PROM: All direction w/ oscillations   HS stretch: contract-relax  LAD, B  Prone quad stretch, B   STM: Gluteals, lumbar paraspinals   DN: Lumbar paraspinals: L3-5: 2x2 rule: x6 / winding / no adverse response    Education and discussion on HEP and treatment regarding the benefits related to " current condition, POC, pathophysiology, and precautions    *added to HEP

## 2024-03-14 ENCOUNTER — TREATMENT (OUTPATIENT)
Dept: PHYSICAL THERAPY | Facility: CLINIC | Age: 64
End: 2024-03-14
Payer: MEDICARE

## 2024-03-14 DIAGNOSIS — M54.41 CHRONIC BILATERAL LOW BACK PAIN WITH BILATERAL SCIATICA: ICD-10-CM

## 2024-03-14 DIAGNOSIS — M54.42 CHRONIC BILATERAL LOW BACK PAIN WITH BILATERAL SCIATICA: ICD-10-CM

## 2024-03-14 DIAGNOSIS — G89.29 CHRONIC BILATERAL LOW BACK PAIN WITH BILATERAL SCIATICA: ICD-10-CM

## 2024-03-14 DIAGNOSIS — M51.36 DDD (DEGENERATIVE DISC DISEASE), LUMBAR: ICD-10-CM

## 2024-03-14 PROCEDURE — 97140 MANUAL THERAPY 1/> REGIONS: CPT | Mod: GP

## 2024-03-14 PROCEDURE — 97110 THERAPEUTIC EXERCISES: CPT | Mod: GP

## 2024-03-14 NOTE — PROGRESS NOTES
Physical Therapy Treatment      Patient Name: Toño Bustamante  MRN: [id number]  Today's Date: 3/14/2024  Visit #3  Time Calculation  Start Time: 0830  Stop Time: 0908  Time Calculation (min): 38 min  Insurance:  Visit Limit: bmn - 25 authorized   Co-Pay: $20    Assessment:  Pt reports dry needling helped alleviate symptom intensity. Continued with dry needling to lumbar paraspinals at L4 level which he tolerated well without adverse reactions. Added figure 4 glute stretch and sciatic nerve glide in sitting to HEP.     Patient is likely to benefit from skilled interventions to address their impairments, and treatment that includes: manual techniques to decrease pain and improve joint/soft-tissue mobility, as well as exercises to increase strength, endurance, and neuromotor control.     Plan:  Continue per POC.    Current Problem:   1. Chronic bilateral low back pain with bilateral sciatica  Follow Up In Physical Therapy      2. DDD (degenerative disc disease), lumbar  Follow Up In Physical Therapy          Subjective   Benji notes that needling helped last session but adds that last night pain was very high and limited his mobility.    Objective   Gait: Decreased step length    Treatments:    Therapeutic Exercise (84672):  HEP reviewed verbally  *LTR  *Bridges - glute set regression   *Sit-to-stand w/ counter support  *Seated Sciatic nerve glides  *Figure 4 glute stretch 30 sec x5   *Seated lateral trunk leans x5 ea    Manual Therapy (04235):  Prone quad stretch, B   STM: Gluteals, lumbar paraspinals   DN: Lumbar paraspinals: L4   Patient educated in dry needling precautions, indications, and contraindications. Patient is aware of the risks and benefits of procedure and wishes to have it performed. No adverse reaction to the dry needling noted.    Needles used: 2x 30mm   PT performed winding, pistoning needle manipulation  Needles left in situ for 5 min    Education and discussion on HEP and treatment regarding the  benefits related to current condition, POC, pathophysiology, and precautions    *added to HEP

## 2024-03-15 ENCOUNTER — APPOINTMENT (OUTPATIENT)
Dept: PAIN MEDICINE | Facility: CLINIC | Age: 64
End: 2024-03-15
Payer: MEDICARE

## 2024-03-20 ENCOUNTER — OFFICE VISIT (OUTPATIENT)
Dept: ORTHOPEDIC SURGERY | Facility: CLINIC | Age: 64
End: 2024-03-20
Payer: MEDICARE

## 2024-03-20 DIAGNOSIS — M48.062 SPINAL STENOSIS OF LUMBAR REGION WITH NEUROGENIC CLAUDICATION: Primary | ICD-10-CM

## 2024-03-20 DIAGNOSIS — M48.061 SPINAL STENOSIS OF LUMBAR REGION, UNSPECIFIED WHETHER NEUROGENIC CLAUDICATION PRESENT: ICD-10-CM

## 2024-03-20 PROCEDURE — 1159F MED LIST DOCD IN RCRD: CPT | Performed by: PHYSICIAN ASSISTANT

## 2024-03-20 PROCEDURE — 99203 OFFICE O/P NEW LOW 30 MIN: CPT | Performed by: PHYSICIAN ASSISTANT

## 2024-03-20 PROCEDURE — 1160F RVW MEDS BY RX/DR IN RCRD: CPT | Performed by: PHYSICIAN ASSISTANT

## 2024-03-21 ENCOUNTER — OFFICE VISIT (OUTPATIENT)
Dept: NEUROSURGERY | Facility: CLINIC | Age: 64
End: 2024-03-21
Payer: MEDICARE

## 2024-03-21 ENCOUNTER — TREATMENT (OUTPATIENT)
Dept: PHYSICAL THERAPY | Facility: CLINIC | Age: 64
End: 2024-03-21
Payer: MEDICARE

## 2024-03-21 VITALS
DIASTOLIC BLOOD PRESSURE: 86 MMHG | SYSTOLIC BLOOD PRESSURE: 142 MMHG | TEMPERATURE: 97.7 F | HEART RATE: 67 BPM | WEIGHT: 223 LBS | HEIGHT: 73 IN | BODY MASS INDEX: 29.55 KG/M2

## 2024-03-21 DIAGNOSIS — M54.16 LEFT LUMBAR RADICULOPATHY: Primary | ICD-10-CM

## 2024-03-21 DIAGNOSIS — M54.41 CHRONIC BILATERAL LOW BACK PAIN WITH BILATERAL SCIATICA: ICD-10-CM

## 2024-03-21 DIAGNOSIS — G89.29 CHRONIC BILATERAL LOW BACK PAIN WITH BILATERAL SCIATICA: ICD-10-CM

## 2024-03-21 DIAGNOSIS — M54.42 CHRONIC BILATERAL LOW BACK PAIN WITH BILATERAL SCIATICA: ICD-10-CM

## 2024-03-21 DIAGNOSIS — M51.36 DDD (DEGENERATIVE DISC DISEASE), LUMBAR: ICD-10-CM

## 2024-03-21 DIAGNOSIS — M21.372 LEFT FOOT DROP: ICD-10-CM

## 2024-03-21 PROBLEM — M48.062 SPINAL STENOSIS OF LUMBAR REGION WITH NEUROGENIC CLAUDICATION: Status: ACTIVE | Noted: 2024-03-21

## 2024-03-21 PROCEDURE — 99203 OFFICE O/P NEW LOW 30 MIN: CPT | Performed by: NURSE PRACTITIONER

## 2024-03-21 PROCEDURE — 1125F AMNT PAIN NOTED PAIN PRSNT: CPT | Performed by: NURSE PRACTITIONER

## 2024-03-21 PROCEDURE — 97140 MANUAL THERAPY 1/> REGIONS: CPT | Mod: GP

## 2024-03-21 PROCEDURE — 1159F MED LIST DOCD IN RCRD: CPT | Performed by: NURSE PRACTITIONER

## 2024-03-21 PROCEDURE — 1160F RVW MEDS BY RX/DR IN RCRD: CPT | Performed by: NURSE PRACTITIONER

## 2024-03-21 ASSESSMENT — PATIENT HEALTH QUESTIONNAIRE - PHQ9
SUM OF ALL RESPONSES TO PHQ9 QUESTIONS 1 & 2: 2
1. LITTLE INTEREST OR PLEASURE IN DOING THINGS: SEVERAL DAYS
2. FEELING DOWN, DEPRESSED OR HOPELESS: SEVERAL DAYS
10. IF YOU CHECKED OFF ANY PROBLEMS, HOW DIFFICULT HAVE THESE PROBLEMS MADE IT FOR YOU TO DO YOUR WORK, TAKE CARE OF THINGS AT HOME, OR GET ALONG WITH OTHER PEOPLE: SOMEWHAT DIFFICULT

## 2024-03-21 ASSESSMENT — PAIN SCALES - GENERAL: PAINLEVEL: 9

## 2024-03-21 NOTE — PROGRESS NOTES
"Physical Therapy Treatment      Patient Name: Toño Bustamante  MRN: [id number]  Today's Date: 3/21/2024  Visit #4     Insurance:  Visit Limit: bmn - 25 authorized   Co-Pay: $20    Assessment:  Benji appears to receive moderate benefits from manual work. Continued w/ current POC in attempts to further symptom alleviation. Reviewed HEP to ensure proper form.     Patient's response to session: Decreased pain    Patient is likely to benefit from skilled interventions to address their impairments, and treatment that includes: manual techniques to decrease pain and improve joint/soft-tissue mobility, as well as exercises to increase strength, endurance, and neuromotor control.     Plan:  Continue per POC.    Current Problem:   1. Chronic bilateral low back pain with bilateral sciatica  Follow Up In Physical Therapy      2. DDD (degenerative disc disease), lumbar  Follow Up In Physical Therapy          Subjective   Benji notes that he received \"about 4 hours of relief\" after last session - which is progress from the hour he felt after the previous session.     Objective     Treatments:    Therapeutic Exercise (67310):  HEP review  *LTR  *Bridges - glute set regression   *Sit-to-stand w/ counter support  *Seated Sciatic nerve glides  *Figure 4 glute stretch 30 sec x5   *Seated lateral trunk leans x5 ea    Manual Therapy (97853):  STM: Gluteals & lumbar paraspinals  DN: Lumbar paraspinals: L4 & L5 / 2/2 rule / e-stim / no adverse   DN: Inferior gluteal HS: 3\" / x 1(each) / e-stim / no adverse    Education and discussion on HEP and treatment regarding the benefits related to current condition, POC, pathophysiology, and precautions    *added to HEP  "

## 2024-03-21 NOTE — PROGRESS NOTES
Toño is a 63-year-old male that presents today with his wife to be evaluated for low back pain, lower extremity radiculopathy and weakness.    The patient was referred by Sharon Bernstein within the [facility].    The patient states that he has always had some degree of pain but over the last 3 months it has progressively gotten worse.  He states that he is in chronic pain all day and is now having to use a cane for stability and ambulate.  He has pain throughout the thoracic or lumbar muscular region but has radicular and claudicating symptoms in his bilateral lower extremities.  The patient states he has often found himself dragging his left leg.  He denies any hip or groin pain.  He has full control of his bowel and bladder.  He does not present with any cervical radiculopathy or myelopathic symptoms.      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    As far as treatment for his low back and radicular symptoms the patient has done a very thorough workup, he has been to a chiropractor, physical therapy he has done laser treatments and dry needling he has been on various muscle relaxers and is currently on gabapentin  2 times a day with minimal improvement.    The patient also followed up at the emergency room where they ordered x-rays and CTs of the lumbar spine.  It reveals that he has moderate to severe stenosis at L3-4 and L4-5.    He did not have any MRIs done.    Moving forward, I informed the patient that in order for us to continue to properly work this up for potential surgical planning we will need him to obtain an MRI of his lumbar spine and then we will have him follow-up with Dr. Collazo.  He was given all the contact information necessary to do this.    This note was dictated using speech recognition software and was not corrected for spelling or grammatical errors

## 2024-03-21 NOTE — PROGRESS NOTES
It was a pleasure to see Toño Bustamante on 3/21/2024. He is a 63 y.o. year old, left-handed male who presents to the [facility] for evaluation of back and leg pain. Patient is referred by No ref. provider found. PMH is significant for sarcoma (s/p RUE amputation). He lives at home with his wife    Toño Bustamante has had symptoms of back and leg (points to  bilateral glutei, into legs and feet) since early 01/2024, without inciting event. He reports flapping of left foot with ambulation and uses a cane. Progression of symptoms prompted visit with Ortho Spine (Dr. Collazo's office at [facility]) on 03/20/2024. MRI L Spine was requested and is scheduled for 04/01/2024. He reports difficulty with stairs coming into his home. He had x-ray on 02/21/2024, that demonstrated L4 fracture prompting PCP to request CT L Spine (completed on 02/23/2024, that demonstrated degenerative changes including foraminal stenosis). He was referred to PT and Pain Medicine by his PCP. Thus far, patient has tried Chiropractic, oral medications, activity adjustment, with little to no improvement of symptoms. He denies change in bowel / bladder function, saddle anesthesia, imbalance, falls, difficulty dressing, difficulty holding / opening objects.     CT L SPINE on 02/23/2024:  IMPRESSION:  Diffusely decreased bone mineralization with no acute osseous  abnormality of the lumbar spine. There is mild remote superior  endplate compression deformity of L4.      Degenerative grade 1 anterolisthesis of L4 on L5.      Multilevel severe spinal canal stenosis including involvement at  L2-L3, L3-L4, and L4-L5 as detailed above. Moderate to severe right  and moderate left neural foraminal narrowing at L3-L4 with additional  moderate left-greater-than-right neural foraminal narrowing at L4-L5.  Signed by: Eladio Winkler 2/23/2024    XR LUMBAR SPINE (3 VWS) on 02/21/2024:  IMPRESSION:  Compression fracture at L4, a  "finding of unknown chronicity. CT can  be performed for complete evaluation Moderate facet disease and  spondylosis worse in the lower lumbar spine   Signed by: Franc Vaughn 2/22/2024    PREVIOUS TREATMENTS  Pain Management: Lumbar TRACY to be done 04/02/2024  PT 03/08/24, 03/12/24, 03/14/24,   Home Exercise Program:  NSAIDs  Gabapentin  Oral steroid  Muscle relaxant  Topical    Previous Spine Surgery: No     Smoker: YES   Anticoagulation / Antiplatelets: YES: NSAID use      ROS: 12 / 12 systems reviewed and are negative unless noted in HPI    /86 (BP Location: Left arm, Patient Position: Sitting, BP Cuff Size: Adult)   Pulse 67   Temp 36.5 °C (97.7 °F) (Temporal)   Ht 1.854 m (6' 1\")   Wt 101 kg (223 lb)   BMI 29.42 kg/m²     ON EXAM:  General: Well developed, awake/alert/oriented x 3, no distress, alert and cooperative  Skin: Warm and dry, no visible lesions / rashes  ENMT: Mucous membranes moist, no apparent injury  Head/Neck: No apparent injury  Respiratory/Thorax: Normal breathing with good chest expansion, thorax symmetric  Cardiovascular: No pitting edema, no JVD  Gastrointestinal: Non-distended  NEUROLOGICAL EXAM:  EOMI, face symmetric  Motor Strength: RUE is absent; LEFT EHL and DORSIFLEXOR is 3 / 5; otherwise, 5/5 in BLE  Muscle Tone: Normal without spasticity or contractures in all extremities  Muscle Bulk: Normal and symmetric in all extremities  Posture:  -- Cervical: Normal  -- Thoracic: Normal  -- Lumbar : Normal  Paraspinal muscle spasm/tenderness absent.  No palpable tenderness along the spinous processes.  Sensation: SILT in BLE  Gait: UNSTEADY WITH USE OF CANE. Unable to arise without arms since bilateral TKA (15 years ago). UNABLE TO STAND ON TOES, HEELS    Deep Tendon Reflexes: bilateral patellar reflexes are absent \"since my replacements\"        Toño Bustamante has left foot drop and left lumbar radiculopathy. We discussed rationale for dynamic lumbar imaging and for continuation " of Physical Therapy / Home Exercise Program. Encouraged follow up with Dr Collazo's office as scheduled after MRI L Spine. He verbalizes understanding and agreement with plan.  Corrie Hartley, HAILEY-CNP

## 2024-03-22 ENCOUNTER — HOSPITAL ENCOUNTER (OUTPATIENT)
Dept: RADIOLOGY | Facility: CLINIC | Age: 64
Discharge: HOME | End: 2024-03-22
Payer: MEDICARE

## 2024-03-22 DIAGNOSIS — M21.372 LEFT FOOT DROP: ICD-10-CM

## 2024-03-22 DIAGNOSIS — M54.16 LEFT LUMBAR RADICULOPATHY: ICD-10-CM

## 2024-03-22 PROCEDURE — 72120 X-RAY BEND ONLY L-S SPINE: CPT

## 2024-03-22 PROCEDURE — 72110 X-RAY EXAM L-2 SPINE 4/>VWS: CPT | Performed by: RADIOLOGY

## 2024-03-25 ENCOUNTER — TREATMENT (OUTPATIENT)
Dept: PHYSICAL THERAPY | Facility: CLINIC | Age: 64
End: 2024-03-25
Payer: MEDICARE

## 2024-03-25 DIAGNOSIS — M54.42 CHRONIC BILATERAL LOW BACK PAIN WITH BILATERAL SCIATICA: ICD-10-CM

## 2024-03-25 DIAGNOSIS — M51.36 DDD (DEGENERATIVE DISC DISEASE), LUMBAR: ICD-10-CM

## 2024-03-25 DIAGNOSIS — M54.41 CHRONIC BILATERAL LOW BACK PAIN WITH BILATERAL SCIATICA: ICD-10-CM

## 2024-03-25 DIAGNOSIS — G89.29 CHRONIC BILATERAL LOW BACK PAIN WITH BILATERAL SCIATICA: ICD-10-CM

## 2024-03-25 PROCEDURE — 97140 MANUAL THERAPY 1/> REGIONS: CPT | Mod: GP

## 2024-03-25 PROCEDURE — 97110 THERAPEUTIC EXERCISES: CPT | Mod: GP

## 2024-03-25 NOTE — PROGRESS NOTES
Physical Therapy Treatment      Patient Name: Toño Bustamante  MRN: [id number]  Today's Date: 3/25/2024  Visit #5  Time Calculation  Start Time: 1430  Stop Time: 1508  Time Calculation (min): 38 min  Insurance:  Visit Limit: bmn - 25 authorized   Co-Pay: $20    Assessment:  Patient tolerated needling and manual treatment well, noted some muscular soreness upon rising from prone position which improved with rest break prior to standing. Issued patient contract information regarding [employer] for additional treatment options with education.     Patient's response to session: Decreased pain    Patient is likely to benefit from skilled interventions to address their impairments, and treatment that includes: manual techniques to decrease pain and improve joint/soft-tissue mobility, as well as exercises to increase strength, endurance, and neuromotor control.     Plan:  Continue per POC.    Current Problem:   1. Chronic bilateral low back pain with bilateral sciatica  Follow Up In Physical Therapy      2. DDD (degenerative disc disease), lumbar  Follow Up In Physical Therapy          Subjective   Benji notes that he felt some pain relief after last PT visit. Adds that pain returned over the weekend, attributes it to standing for longer time period.   4/10 pain in lower lumbar spine/upper gluteal region    Objective     Treatments:    Therapeutic Exercise (44909):  HEP review  *LTR in hooklying x10  *Bridges - glute set regression x10  *Sit-to-stand w/ counter support x5  *Seated Sciatic nerve glides x15 ea  *LTR with PB in hooklying x20  *DKTC with PT assist with PB x20    Manual Therapy (64284):  STM: Gluteals & lumbar paraspinals  DN: Lumbar paraspinals: L4 & L5 / 2/2 rule / e-stim / no adverse   1x30mm needle used on each paraspinal mm at L4/L5 level   PT performed pistoning needle manipulation to each    Education and discussion on HEP and treatment regarding the benefits related to current condition, POC,  pathophysiology, and precautions    *added to HEP

## 2024-03-28 ENCOUNTER — TREATMENT (OUTPATIENT)
Dept: PHYSICAL THERAPY | Facility: CLINIC | Age: 64
End: 2024-03-28
Payer: MEDICARE

## 2024-03-28 DIAGNOSIS — M51.36 DDD (DEGENERATIVE DISC DISEASE), LUMBAR: ICD-10-CM

## 2024-03-28 DIAGNOSIS — G89.29 CHRONIC BILATERAL LOW BACK PAIN WITH BILATERAL SCIATICA: ICD-10-CM

## 2024-03-28 DIAGNOSIS — M54.41 CHRONIC BILATERAL LOW BACK PAIN WITH BILATERAL SCIATICA: ICD-10-CM

## 2024-03-28 DIAGNOSIS — M54.42 CHRONIC BILATERAL LOW BACK PAIN WITH BILATERAL SCIATICA: ICD-10-CM

## 2024-03-28 PROCEDURE — 97140 MANUAL THERAPY 1/> REGIONS: CPT | Mod: GP

## 2024-03-28 PROCEDURE — 97110 THERAPEUTIC EXERCISES: CPT | Mod: GP

## 2024-03-28 NOTE — PROGRESS NOTES
Physical Therapy Treatment      Patient Name: Toño Bustamante  MRN: [id number]  Today's Date: 3/28/2024  Visit #6     Insurance:  Visit Limit: bmn - 25 authorized   Co-Pay: $20    Assessment:  Patient limited this session by LE knee and hip pain symptoms. Regressed exercise reps and intensity and introduced rows, abd with trunk movement to facilitate more trunk mobility with UE use which patient was able to perform without pain increase. Encouraged to reach out to [employer] for alternative/additional treatment options for pain symptoms.    Patient's response to session: Decreased pain    Patient is likely to benefit from skilled interventions to address their impairments, and treatment that includes: manual techniques to decrease pain and improve joint/soft-tissue mobility, as well as exercises to increase strength, endurance, and neuromotor control.     Plan:  Continue per POC.    Current Problem:   1. Chronic bilateral low back pain with bilateral sciatica  Follow Up In Physical Therapy      2. DDD (degenerative disc disease), lumbar  Follow Up In Physical Therapy          Subjective   Pt reports feeling increased gluteal soreness after last session. Adds that ambulation has been difficult today.    Objective     Treatments:    Therapeutic Exercise (36583):  HEP review  *Seated Sciatic nerve glides 3x15 ea  *Seated lateral trunk leans x10 ea  *Seated uni-Row with RTB with ipsilateral trunk rotation 3x10  *Seated uni-abd with RTB with ipsilateral trunk side bend+rotation 2x10       Manual Therapy (25186):  STM: lumbar paraspinals L3-L4 level  DN: Lumbar paraspinals: L3 & L4 / 2/2 rule/ no adverse reactions  1x30mm needle used on each paraspinal mm at L4/L5 level       Education and discussion on HEP and treatment regarding the benefits related to current condition, POC, pathophysiology, and precautions    *added to HEP

## 2024-03-30 ENCOUNTER — HOSPITAL ENCOUNTER (OUTPATIENT)
Dept: RADIOLOGY | Facility: CLINIC | Age: 64
Discharge: HOME | End: 2024-03-30
Payer: MEDICARE

## 2024-03-30 DIAGNOSIS — M48.061 SPINAL STENOSIS OF LUMBAR REGION, UNSPECIFIED WHETHER NEUROGENIC CLAUDICATION PRESENT: ICD-10-CM

## 2024-03-30 PROCEDURE — 72148 MRI LUMBAR SPINE W/O DYE: CPT | Performed by: RADIOLOGY

## 2024-03-30 PROCEDURE — 72148 MRI LUMBAR SPINE W/O DYE: CPT

## 2024-04-01 ENCOUNTER — TREATMENT (OUTPATIENT)
Dept: PHYSICAL THERAPY | Facility: CLINIC | Age: 64
End: 2024-04-01
Payer: MEDICARE

## 2024-04-01 ENCOUNTER — HOSPITAL ENCOUNTER (OUTPATIENT)
Dept: RADIOLOGY | Facility: CLINIC | Age: 64
End: 2024-04-01
Payer: MEDICARE

## 2024-04-01 DIAGNOSIS — M54.41 CHRONIC BILATERAL LOW BACK PAIN WITH BILATERAL SCIATICA: ICD-10-CM

## 2024-04-01 DIAGNOSIS — G89.29 CHRONIC BILATERAL LOW BACK PAIN WITH BILATERAL SCIATICA: ICD-10-CM

## 2024-04-01 DIAGNOSIS — M51.36 DDD (DEGENERATIVE DISC DISEASE), LUMBAR: ICD-10-CM

## 2024-04-01 DIAGNOSIS — M54.42 CHRONIC BILATERAL LOW BACK PAIN WITH BILATERAL SCIATICA: ICD-10-CM

## 2024-04-01 PROCEDURE — 97110 THERAPEUTIC EXERCISES: CPT | Mod: GP

## 2024-04-01 PROCEDURE — 97116 GAIT TRAINING THERAPY: CPT | Mod: GP

## 2024-04-01 PROCEDURE — 97140 MANUAL THERAPY 1/> REGIONS: CPT | Mod: GP

## 2024-04-01 NOTE — PROGRESS NOTES
Physical Therapy Treatment      Patient Name: Toño Bustamante  MRN: [id number]  Today's Date: 4/1/2024  Visit #7  Time Calculation  Start Time: 1014  Stop Time: 1052  Time Calculation (min): 38 min  Insurance:  Visit Limit: bmn - 25 authorized   Co-Pay: $20    Assessment:  Patient did well with gait training and reported more comfort in back with increase in cane height. Encouraged to continue implementing 3-pt pattern into everyday mobility. Patient noted less pain upon completion of today's session.    Patient's response to session: Decreased pain    Patient is likely to benefit from skilled interventions to address their impairments, and treatment that includes: manual techniques to decrease pain and improve joint/soft-tissue mobility, as well as exercises to increase strength, endurance, and neuromotor control.     Plan:  Continue per POC.    Current Problem:   1. Chronic bilateral low back pain with bilateral sciatica  Follow Up In Physical Therapy      2. DDD (degenerative disc disease), lumbar  Follow Up In Physical Therapy          Subjective   Pt completed his MRI on 3/30/24 and has an appointment with Dr. Collazo on 4/3/24 to discuss findings and treatment options. Reports soreness was not too bad after last visit but was feeling some leg spasms over the weekend after performing some ADL's.        Objective     Treatments:    Therapeutic Exercise ([id number]):  HEP review  *Seated Sciatic nerve glides 3x15 ea  *Seated lateral trunk leans x10 ea  *Fwd lunge hip flexor stretch on 4 in step 10 sec x5 each      Manual Therapy (74885):  STM: lumbar paraspinals L3-L4 level  DN: Lumbar paraspinals: L3 & L4 / 2/2 rule/ no adverse reactions  1x30mm needle used on each paraspinal mm at L4/L5 level   1x30mm needle in upper gluteal scar fibers     Gait Training (00804):   Re-sized quad cane for increased height  Ambulation with 3-pt gait step through pattern for more energy efficient gait and less spinal strain when  advancing ZENAIDA Redman.  100 ft x3    Education and discussion on HEP and treatment regarding the benefits related to current condition, POC, pathophysiology, and precautions    *added to HEP

## 2024-04-02 ENCOUNTER — APPOINTMENT (OUTPATIENT)
Dept: PAIN MEDICINE | Facility: CLINIC | Age: 64
End: 2024-04-02
Payer: MEDICARE

## 2024-04-03 ENCOUNTER — OFFICE VISIT (OUTPATIENT)
Dept: ORTHOPEDIC SURGERY | Facility: CLINIC | Age: 64
End: 2024-04-03
Payer: MEDICARE

## 2024-04-03 DIAGNOSIS — M48.062 SPINAL STENOSIS OF LUMBAR REGION WITH NEUROGENIC CLAUDICATION: ICD-10-CM

## 2024-04-03 PROCEDURE — 1159F MED LIST DOCD IN RCRD: CPT | Performed by: ORTHOPAEDIC SURGERY

## 2024-04-03 PROCEDURE — 1160F RVW MEDS BY RX/DR IN RCRD: CPT | Performed by: ORTHOPAEDIC SURGERY

## 2024-04-03 PROCEDURE — 99214 OFFICE O/P EST MOD 30 MIN: CPT | Performed by: ORTHOPAEDIC SURGERY

## 2024-04-03 NOTE — PROGRESS NOTES
This 63-year-old man presents with rather severe and disabling back and bilateral leg pain.    Up until 3 or 4 months ago, he was fairly active, walking several miles a day on a treadmill.  However for the last 3 months he has had effectively inability to stand and ambulate for longer than 30 seconds or so.  He has quite severe pain as well as balance issues.  He feels unstable when ambulating.    He has had a very remote right upper extremity amputation for a sarcoma.  He had adjuvant therapy following this including chest surgery to address metastatic disease.  He apparently has been disease-free for many years.    He has been through physical therapy.  He had an epidural injection scheduled for yesterday but he chose not to proceed because he knew he was coming for surgical consultation and his symptoms have been disabling.    He has had successful bilateral total knee replacements.    Family, social, and medical histories are obtained and reviewed.    30-point, patient-recorded Review of Systems is personally obtained and reviewed. Inclusive is no history of weight loss, change in appetite, recent change in activity level, change in bowel or bladder habits, fevers, chills, malaise, or night pain.    On exam he is a pleasant older man.  He has a kyphotic posture.  He has difficulty ambulating.  He has a wide-based, very unstable gait.    Painless motion both hips.  He has weakness proximally in hip flexion and quadriceps 4/5 and ankle dorsiflexion bilaterally 3/5.    No hyperreflexia.    Plain films show moderate degenerative change with a retrolisthesis of L2 on 3 and an anterolisthesis of L4 on 5.  He has a moderate coronal plane deformity.    His MRI shows quite severe stenosis at L3-4 in part due to a large left-sided synovial cyst as well as moderately severe stenosis at L4-5.    Impression: This man has become disabled with severe lumbar radiculopathy but he also has a very altered gait.    Given the  severity of his clinical and radiographic presentation including objective weakness and profound gait abnormality, surgery is indicated.  Likely, this would be in the form of a lateral interbody fusion L2-3 and L3-4 and then a posterior decompression and instrumented fusion L2-5.    Given his myelopathic features, I would like to obtain an MRI of the cervical and thoracic spine.    We talked about the risks and benefits and expectations of surgery.    He would like to proceed.    I will speak with him after his MRIs are done.    ** Dictated with voice recognition software and not immediately reviewed for errors in grammar and/or spelling **

## 2024-04-03 NOTE — LETTER
April 3, 2024     José Manuel Brunner DO  [address]  [address]  [facility] [address]    Patient: Toño Bustamante   YOB: 1960   Date of Visit: 4/3/2024       Dear Dr. José Manuel Brunner DO:    Thank you for referring Toño Bustamante to me for evaluation. Below are my notes for this consultation.  If you have questions, please do not hesitate to call me. I look forward to following your patient along with you.       Sincerely,     Burton Collazo MD      CC: No Recipients  ______________________________________________________________________________________    This 63-year-old man presents with rather severe and disabling back and bilateral leg pain.    Up until 3 or 4 months ago, he was fairly active, walking several miles a day on a treadmill.  However for the last 3 months he has had effectively inability to stand and ambulate for longer than 30 seconds or so.  He has quite severe pain as well as balance issues.  He feels unstable when ambulating.    He has had a very remote right upper extremity amputation for a sarcoma.  He had adjuvant therapy following this including chest surgery to address metastatic disease.  He apparently has been disease-free for many years.    He has been through physical therapy.  He had an epidural injection scheduled for yesterday but he chose not to proceed because he knew he was coming for surgical consultation and his symptoms have been disabling.    He has had successful bilateral total knee replacements.    Family, social, and medical histories are obtained and reviewed.    30-point, patient-recorded Review of Systems is personally obtained and reviewed. Inclusive is no history of weight loss, change in appetite, recent change in activity level, change in bowel or bladder habits, fevers, chills, malaise, or night pain.    On exam he is a pleasant older man.  He has a kyphotic posture.  He has difficulty ambulating.  He has a wide-based, very  unstable gait.    Painless motion both hips.  He has weakness proximally in hip flexion and quadriceps 4/5 and ankle dorsiflexion bilaterally 3/5.    No hyperreflexia.    Plain films show moderate degenerative change with a retrolisthesis of L2 on 3 and an anterolisthesis of L4 on 5.  He has a moderate coronal plane deformity.    His MRI shows quite severe stenosis at L3-4 in part due to a large left-sided synovial cyst as well as moderately severe stenosis at L4-5.    Impression: This man has become disabled with severe lumbar radiculopathy but he also has a very altered gait.    Given the severity of his clinical and radiographic presentation including objective weakness and profound gait abnormality, surgery is indicated.  Likely, this would be in the form of a lateral interbody fusion L2-3 and L3-4 and then a posterior decompression and instrumented fusion L2-5.    Given his myelopathic features, I would like to obtain an MRI of the cervical and thoracic spine.    We talked about the risks and benefits and expectations of surgery.    He would like to proceed.    I will speak with him after his MRIs are done.    ** Dictated with voice recognition software and not immediately reviewed for errors in grammar and/or spelling **

## 2024-04-04 ENCOUNTER — TREATMENT (OUTPATIENT)
Dept: PHYSICAL THERAPY | Facility: CLINIC | Age: 64
End: 2024-04-04
Payer: MEDICARE

## 2024-04-04 DIAGNOSIS — M51.36 DDD (DEGENERATIVE DISC DISEASE), LUMBAR: ICD-10-CM

## 2024-04-04 DIAGNOSIS — G89.29 CHRONIC BILATERAL LOW BACK PAIN WITH BILATERAL SCIATICA: ICD-10-CM

## 2024-04-04 DIAGNOSIS — M54.42 CHRONIC BILATERAL LOW BACK PAIN WITH BILATERAL SCIATICA: ICD-10-CM

## 2024-04-04 DIAGNOSIS — M54.41 CHRONIC BILATERAL LOW BACK PAIN WITH BILATERAL SCIATICA: ICD-10-CM

## 2024-04-04 PROCEDURE — 97110 THERAPEUTIC EXERCISES: CPT | Mod: GP

## 2024-04-04 PROCEDURE — 97140 MANUAL THERAPY 1/> REGIONS: CPT | Mod: GP

## 2024-04-04 NOTE — PROGRESS NOTES
"Physical Therapy Treatment      Patient Name: Toño Bustamante  MRN: [id number]  Today's Date: 4/4/2024  Visit #8  Time Calculation  Start Time: 1715  Stop Time: 1800  Time Calculation (min): 45 min    Insurance:  Visit Limit: bmn - 25 authorized   Co-Pay: $20    Assessment:  Benji presents to therapy w/ no changes in his familiar symptoms. He still is provided w/ temporary relief following surgery - but ultimately returns to his baseline. He responded well to manual today. Used this time to discuss POC, pain management, and expectations.     Patient's response to session: Decreased pain, Increased ROM/joint mobility, and Improved gait    Patient is likely to benefit from skilled interventions to address their impairments, and treatment that includes: manual techniques to decrease pain and improve joint/soft-tissue mobility, as well as exercises to increase strength, endurance, and neuromotor control.     Plan:  Continue per POC.    Current Problem:   1. Chronic bilateral low back pain with bilateral sciatica  Follow Up In Physical Therapy      2. DDD (degenerative disc disease), lumbar  Follow Up In Physical Therapy          Subjective   Benji presents to therapy w/ increased LBP and B LE muscle spasms. Increased time required to ambulate to treatment area today. Notes that he met w/ Dr. Collazo yesterday. Imaging reveals \"quite severe stenosis at L3-4 in part due to a large left-sided cyst as well as moderately severe stenosis at L4-5.\" He is schedule for an MRI of the cervical and thoracic spine. According to Dr. Collazo - \"surgery is indicated.\"    Objective       Treatments:  PT Therapeutic Procedures Time Entry  Manual Therapy Time Entry: 35  Therapeutic Exercise Time Entry: 10    Therapeutic Exercise (72926):  HEP review  *Seated Sciatic nerve glides 3x15 ea  *Seated lateral trunk leans x10 ea  *Fwd lunge hip flexor stretch on 4 in step 10 sec x5 each    Education/Discussion: Surgery expectations, therapy POC, pain " management, ice vs heat, active rest    Manual Therapy (92405):  STM: lumbar paraspinals L3-L4 level  DN: Lumbar paraspinals: L3 & L4 / 2/2 rule/ no adverse reactions  LAD, B

## 2024-04-10 ENCOUNTER — APPOINTMENT (OUTPATIENT)
Dept: PHYSICAL THERAPY | Facility: CLINIC | Age: 64
End: 2024-04-10
Payer: MEDICARE

## 2024-04-12 ENCOUNTER — HOSPITAL ENCOUNTER (OUTPATIENT)
Dept: RADIOLOGY | Facility: CLINIC | Age: 64
Discharge: HOME | End: 2024-04-12
Payer: MEDICARE

## 2024-04-12 DIAGNOSIS — M48.062 SPINAL STENOSIS OF LUMBAR REGION WITH NEUROGENIC CLAUDICATION: ICD-10-CM

## 2024-04-12 PROCEDURE — 72141 MRI NECK SPINE W/O DYE: CPT

## 2024-04-12 PROCEDURE — 72146 MRI CHEST SPINE W/O DYE: CPT | Performed by: RADIOLOGY

## 2024-04-12 PROCEDURE — 72146 MRI CHEST SPINE W/O DYE: CPT

## 2024-04-12 PROCEDURE — 72141 MRI NECK SPINE W/O DYE: CPT | Performed by: RADIOLOGY

## 2024-04-15 ENCOUNTER — DOCUMENTATION (OUTPATIENT)
Dept: ORTHOPEDICS | Facility: HOSPITAL | Age: 64
End: 2024-04-15
Payer: MEDICARE

## 2024-04-15 NOTE — PROGRESS NOTES
I spoke with Toño.  His cervical and thoracic MRIs do not reveal any significant spinal cord compression.  He continues to be quite symptomatic with lumbar radiculopathy and claudication.    He has a coronal plane deformity as well as multilevel stenosis.    He is a candidate for surgery in the form of an L2-3 and L3-4 lateral interbody fusion and a posterior decompression and fusion L2-5 with an L4-5 TLIF.  We discussed the nature of this at length.    He would like to proceed.    ** Dictated with voice recognition software and not immediately reviewed for errors in grammar and/or spelling **

## 2024-04-16 ENCOUNTER — APPOINTMENT (OUTPATIENT)
Dept: PHYSICAL THERAPY | Facility: CLINIC | Age: 64
End: 2024-04-16
Payer: MEDICARE

## 2024-04-16 PROBLEM — M48.062 SPINAL STENOSIS, LUMBAR REGION WITH NEUROGENIC CLAUDICATION: Status: ACTIVE | Noted: 2024-04-16

## 2024-04-19 ENCOUNTER — APPOINTMENT (OUTPATIENT)
Dept: PHYSICAL THERAPY | Facility: CLINIC | Age: 64
End: 2024-04-19
Payer: MEDICARE

## 2024-04-19 ENCOUNTER — APPOINTMENT (OUTPATIENT)
Dept: PREADMISSION TESTING | Facility: HOSPITAL | Age: 64
End: 2024-04-19
Payer: MEDICARE

## 2024-04-23 ENCOUNTER — PRE-ADMISSION TESTING (OUTPATIENT)
Dept: PREADMISSION TESTING | Facility: HOSPITAL | Age: 64
End: 2024-04-23
Payer: MEDICARE

## 2024-04-23 ENCOUNTER — LAB (OUTPATIENT)
Dept: LAB | Facility: LAB | Age: 64
End: 2024-04-23
Payer: MEDICARE

## 2024-04-23 VITALS
HEIGHT: 72 IN | HEART RATE: 83 BPM | WEIGHT: 220.24 LBS | OXYGEN SATURATION: 97 % | TEMPERATURE: 96.5 F | BODY MASS INDEX: 29.83 KG/M2 | SYSTOLIC BLOOD PRESSURE: 140 MMHG | RESPIRATION RATE: 18 BRPM | DIASTOLIC BLOOD PRESSURE: 91 MMHG

## 2024-04-23 DIAGNOSIS — Z01.818 PREOP TESTING: Primary | ICD-10-CM

## 2024-04-23 DIAGNOSIS — Z01.818 PREOP TESTING: ICD-10-CM

## 2024-04-23 DIAGNOSIS — R06.02 SOB (SHORTNESS OF BREATH) ON EXERTION: ICD-10-CM

## 2024-04-23 LAB
ABO GROUP (TYPE) IN BLOOD: NORMAL
ANION GAP SERPL CALC-SCNC: 12 MMOL/L (ref 10–20)
ANTIBODY SCREEN: NORMAL
APPEARANCE UR: CLEAR
BASOPHILS # BLD AUTO: 0.07 X10*3/UL (ref 0–0.1)
BASOPHILS NFR BLD AUTO: 0.9 %
BILIRUB UR STRIP.AUTO-MCNC: NEGATIVE MG/DL
BUN SERPL-MCNC: 14 MG/DL (ref 6–23)
CALCIUM SERPL-MCNC: 9.2 MG/DL (ref 8.6–10.3)
CHLORIDE SERPL-SCNC: 102 MMOL/L (ref 98–107)
CO2 SERPL-SCNC: 31 MMOL/L (ref 21–32)
COLOR UR: NORMAL
CREAT SERPL-MCNC: 0.85 MG/DL (ref 0.5–1.3)
EGFRCR SERPLBLD CKD-EPI 2021: >90 ML/MIN/1.73M*2
EOSINOPHIL # BLD AUTO: 0.37 X10*3/UL (ref 0–0.7)
EOSINOPHIL NFR BLD AUTO: 4.8 %
ERYTHROCYTE [DISTWIDTH] IN BLOOD BY AUTOMATED COUNT: 13.4 % (ref 11.5–14.5)
GLUCOSE SERPL-MCNC: 91 MG/DL (ref 74–99)
GLUCOSE UR STRIP.AUTO-MCNC: NORMAL MG/DL
HCT VFR BLD AUTO: 42.9 % (ref 41–52)
HGB BLD-MCNC: 14.5 G/DL (ref 13.5–17.5)
IMM GRANULOCYTES # BLD AUTO: 0.01 X10*3/UL (ref 0–0.7)
IMM GRANULOCYTES NFR BLD AUTO: 0.1 % (ref 0–0.9)
KETONES UR STRIP.AUTO-MCNC: NEGATIVE MG/DL
LEUKOCYTE ESTERASE UR QL STRIP.AUTO: NEGATIVE
LYMPHOCYTES # BLD AUTO: 2.24 X10*3/UL (ref 1.2–4.8)
LYMPHOCYTES NFR BLD AUTO: 29.3 %
MCH RBC QN AUTO: 33 PG (ref 26–34)
MCHC RBC AUTO-ENTMCNC: 33.8 G/DL (ref 32–36)
MCV RBC AUTO: 98 FL (ref 80–100)
MONOCYTES # BLD AUTO: 0.72 X10*3/UL (ref 0.1–1)
MONOCYTES NFR BLD AUTO: 9.4 %
NEUTROPHILS # BLD AUTO: 4.23 X10*3/UL (ref 1.2–7.7)
NEUTROPHILS NFR BLD AUTO: 55.5 %
NITRITE UR QL STRIP.AUTO: NEGATIVE
NRBC BLD-RTO: 0 /100 WBCS (ref 0–0)
PH UR STRIP.AUTO: 5.5 [PH]
PLATELET # BLD AUTO: 277 X10*3/UL (ref 150–450)
POTASSIUM SERPL-SCNC: 4.7 MMOL/L (ref 3.5–5.3)
PROT UR STRIP.AUTO-MCNC: NEGATIVE MG/DL
RBC # BLD AUTO: 4.39 X10*6/UL (ref 4.5–5.9)
RBC # UR STRIP.AUTO: NEGATIVE /UL
RH FACTOR (ANTIGEN D): NORMAL
SODIUM SERPL-SCNC: 140 MMOL/L (ref 136–145)
SP GR UR STRIP.AUTO: 1.03
UROBILINOGEN UR STRIP.AUTO-MCNC: NORMAL MG/DL
WBC # BLD AUTO: 7.6 X10*3/UL (ref 4.4–11.3)

## 2024-04-23 PROCEDURE — 80048 BASIC METABOLIC PNL TOTAL CA: CPT

## 2024-04-23 PROCEDURE — 87081 CULTURE SCREEN ONLY: CPT | Mod: AHULAB | Performed by: PHYSICIAN ASSISTANT

## 2024-04-23 PROCEDURE — 85025 COMPLETE CBC W/AUTO DIFF WBC: CPT

## 2024-04-23 PROCEDURE — 86900 BLOOD TYPING SEROLOGIC ABO: CPT

## 2024-04-23 PROCEDURE — 99204 OFFICE O/P NEW MOD 45 MIN: CPT | Performed by: PHYSICIAN ASSISTANT

## 2024-04-23 PROCEDURE — 86850 RBC ANTIBODY SCREEN: CPT

## 2024-04-23 PROCEDURE — 36415 COLL VENOUS BLD VENIPUNCTURE: CPT

## 2024-04-23 PROCEDURE — 81003 URINALYSIS AUTO W/O SCOPE: CPT

## 2024-04-23 PROCEDURE — 86901 BLOOD TYPING SEROLOGIC RH(D): CPT

## 2024-04-23 RX ORDER — CHLORHEXIDINE GLUCONATE ORAL RINSE 1.2 MG/ML
SOLUTION DENTAL
Qty: 475 ML | Refills: 0 | Status: SHIPPED | OUTPATIENT
Start: 2024-04-23 | End: 2024-05-14 | Stop reason: HOSPADM

## 2024-04-23 ASSESSMENT — ENCOUNTER SYMPTOMS
DYSURIA: 0
ARTHRALGIAS: 1
SKIN CHANGES: 0
MYALGIAS: 0
CHILLS: 0
RHINORRHEA: 0
CONSTIPATION: 1
NUMBNESS: 0
PALPITATIONS: 0
NAUSEA: 0
ABDOMINAL DISTENTION: 0
SINUS CONGESTION: 0
BLOOD IN STOOL: 0
BRUISES/BLEEDS EASILY: 0
DIARRHEA: 0
WHEEZING: 0
DYSPNEA WITH EXERTION: 0
TROUBLE SWALLOWING: 0
DOUBLE VISION: 0
EYE DISCHARGE: 0
LIMITED RANGE OF MOTION: 0
COUGH: 0
EXCESSIVE BLEEDING: 0
NECK STIFFNESS: 0
FEVER: 0
LIGHT-HEADEDNESS: 0
SHORTNESS OF BREATH: 0
VOMITING: 0
EYE PAIN: 0
ABDOMINAL PAIN: 0
HEMOPTYSIS: 0
UNEXPECTED WEIGHT CHANGE: 0
DIFFICULTY URINATING: 0
WOUND: 0
VISUAL CHANGE: 0
DYSPNEA AT REST: 0
WEAKNESS: 0
CONFUSION: 0
NECK PAIN: 0

## 2024-04-23 NOTE — PREPROCEDURE INSTRUCTIONS
Medication List            Accurate as of April 23, 2024  3:04 PM. Always use your most recent med list.                acetaminophen 650 mg ER tablet  Commonly known as: Tylenol 8 HOUR  Medication Adjustments for Surgery: Take morning of surgery with sip of water, no other fluids     chlorhexidine 0.12 % solution  Commonly known as: Peridex  Swish for 30 seconds and spit 15mL of solution the night before and morning of surgery     ibuprofen 200 mg tablet  Medication Adjustments for Surgery: Stop 7 days before surgery     omega 3-dha-epa-fish oil 360 mg-108 mg- 180 mg-1,200 mg capsule  Medication Adjustments for Surgery: Stop 7 days before surgery     orphenadrine 100 mg 12 hr tablet  Commonly known as: Norflex  Take 1 tablet (100 mg) by mouth 2 times a day as needed for muscle spasms. Do not crush, chew, or split.  Notes to patient: Ok to take morning of surgery if needed     PRESERVISION AREDS ORAL  Medication Adjustments for Surgery: Stop 7 days before surgery     Tylenol PM Extra Strength  mg per tablet  Generic drug: diphenhydrAMINE-acetaminophen  Medication Adjustments for Surgery: Continue until night before surgery     VITAMIN C ORAL  Medication Adjustments for Surgery: Stop 7 days before surgery                          **Concerning above medication instructions, if medication is normally taken at night, continue normal schedule.**  **DO NOT TAKE NIGHT PRIOR AND MORNING OF SURGERY**    CONTACT SURGEON'S OFFICE IF YOU DEVELOP:  * Fever = 100.4 F   * New respiratory symptoms (e.g. cough, shortness of breath, respiratory distress, sore throat)  * Recent loss of taste or smell  *Flu like symptoms such as headache, fatigue or gastrointestinal symptoms  * You develop any open sores, shingles, burning or painful urination   AND/OR:  * You no longer wish to have the surgery.  * Any other personal circumstances change that may lead to the need to cancel or defer this surgery.  *You were admitted to any  hospital within one week of your planned procedure.    SMOKING:  *Quitting smoking can make a huge difference to your health and recovery from surgery.    *If you need help with quitting, call 9-022-QUIT-NOW.    THE DAY BEFORE SURGERY:  *Do not eat any food after midnight the night before surgery.   *You are permitted to drink clear liquids (i.e. water, black coffee (no milk or cream), tea, apple juice and electrolyte drinks (gatorade)) up to 13.5 ounces, up to 2 hours before your arrival time.  *You may chew gum until 2 hours before your surgery    SURGICAL TIME  *You will be contacted between 2 p.m. and 6 p.m. the business day before your surgery with your arrival time.  *If you haven't received a call by 6pm, call [phone].  *Scheduled surgery times may change and you will be notified if this occurs-check your personal voicemail for any updates.    ON THE MORNING OF SURGERY:  *Wear comfortable, loose fitting clothing.   *Do not use moisturizers, creams, lotions or perfume.  *All jewelry and valuables should be left at home.  *Prosthetic devices such as contact lenses, hearing aids, dentures, eyelash extensions, hairpins and body piercing must be removed before surgery.    BRING WITH YOU:  *Photo ID and insurance card  *Current list of medicines and allergies  *Pacemaker/Defibrillator/Heart stent cards  *CPAP machine and mask  *Slings/splints/crutches  *Copy of your complete Advanced Directive/DHPOA-if applicable  *Neurostimulator implant remote    PARKING AND ARRIVAL:  *Check in at the Main Entrance desk and let them know you are here for surgery.  *You will be directed to the 2nd floor surgical waiting area.    AFTER OUTPATIENT SURGERY:  *A responsible adult MUST accompany you at the time of discharge and stay with you for 24 hours after your surgery.  *You may NOT drive yourself home after surgery.  *You may use a taxi or ride sharing service (Lyft, Uber) to return home ONLY if you are accompanied by a  friend or family member.  *Instructions for resuming your medications will be provided by your surgeon.

## 2024-04-23 NOTE — CPM/PAT H&P
[facility], formerly [facility] Evaluation       Name: Toño Bustamante (Toño Bustamante)  /Age: 1960/63 y.o.         Date of Consult: 24    Referring Provider: Dr. Collazo    Surgery, Date, and Length: L2-L3; L3-L4 Lateral Interbody Fusion; L2-L5 Decompression with Fusion; L4-L5 Transforaminal Lumbar Interbody Fusion , 5/10/24, 330MIN    Toño Bustamante is a 63 year-old male who presents to the [facility] for perioperative risk assessment prior to surgery.    Patient presents with a primary diagnosis of lumbar spinal stenosis with associated severe and disabling back and bilateral leg pain. Up until 3 or 4 months ago, he was fairly active, walking several miles a day on a treadmill. However for the last 3 months he has had effectively inability to stand and ambulate for longer than 30 seconds or so. He has quite severe pain as well as balance issues. He feels unstable when ambulating. Pt wishes to proceed with surgery as planned.     This note was created in part upon personal review of patient's medical records.      Patient is scheduled to have L2-L3; L3-L4 Lateral Interbody Fusion; L2-L5 Decompression with Fusion; L4-L5 Transforaminal Lumbar Interbody Fusion       Pt denies any past history of anesthetic complications such as PONV, awareness, prolonged sedation, dental damage, aspiration, cardiac arrest, difficult intubation, difficult I.V. access or unexpected hospital admissions.  NO malignant hyperthermia and or pseudocholinesterase deficiency.  No history of blood transfusions     The patient is not a [employer] and will accept blood and blood products if medically indicated.   Type and screen sent.    Past Medical History:   Diagnosis Date    Carcinoma of soft tissue of arm, right (Multi)     amputation with chemo    Carpal tunnel syndrome, left upper limb     Constipation     DJD (degenerative joint disease), cervical     Osteoarthritis     Osteoarthritis     Psoriasis     Spinal stenosis, lumbar region  with neurogenic claudication     Stable branch retinal vein occlusion of left eye (CMS-HCC)     Wears glasses        Past Surgical History:   Procedure Laterality Date    ARM AMPUTATION AT SHOULDER Right     COLONOSCOPY      HERNIA REPAIR Right     inguinal    LUNG SURGERY      nodules    TOTAL KNEE ARTHROPLASTY Bilateral        Patient Sexual activity questions deferred to the physician.    Family History   Problem Relation Name Age of Onset    Arthritis Mother      Osteoporosis Mother       Social History     Socioeconomic History    Marital status:      Spouse name: Not on file    Number of children: Not on file    Years of education: Not on file    Highest education level: Not on file   Occupational History    Not on file   Tobacco Use    Smoking status: Every Day     Current packs/day: 0.25     Average packs/day: 0.8 packs/day for 43.3 years (36.3 ttl pk-yrs)     Types: Cigarettes     Start date: 1975     Last attempt to quit: 2009    Smokeless tobacco: Never   Vaping Use    Vaping status: Never Used   Substance and Sexual Activity    Alcohol use: Not Currently    Drug use: Not Currently     Types: Marijuana    Sexual activity: Defer   Other Topics Concern    Not on file   Social History Narrative    Not on file     Social Determinants of Health     Financial Resource Strain: Not on file   Food Insecurity: Not on file   Transportation Needs: Not on file   Physical Activity: Not on file   Stress: Not on file   Social Connections: Not on file   Intimate Partner Violence: Not on file   Housing Stability: Not on file        Allergies   Allergen Reactions    Clobetasol Itching    Adhesive Rash    Varenicline Other     Sleep disturbance       Current Outpatient Medications:     acetaminophen (Tylenol 8 HOUR) 650 mg ER tablet, Take 1 tablet (650 mg) by mouth every 8 hours if needed for mild pain (1 - 3). Do not crush, chew, or split., Disp: , Rfl:     ascorbic acid (VITAMIN C ORAL), Take 2,000 mg by mouth early  in the morning.., Disp: , Rfl:     diphenhydrAMINE-acetaminophen (Tylenol PM Extra Strength)  mg per tablet, Take 1 tablet by mouth as needed at bedtime., Disp: , Rfl:     ibuprofen 200 mg tablet, Take 1 tablet (200 mg) by mouth every 6 hours if needed., Disp: , Rfl:     omega 3-dha-epa-fish oil 360 mg-108 mg- 180 mg-1,200 mg capsule, Take 1 capsule by mouth once daily., Disp: , Rfl:     orphenadrine (Norflex) 100 mg 12 hr tablet, Take 1 tablet (100 mg) by mouth 2 times a day as needed for muscle spasms. Do not crush, chew, or split., Disp: 60 tablet, Rfl: 3    vit A/vit C/vit E/zinc/copper (PRESERVISION AREDS ORAL), Take 1 capsule by mouth once daily., Disp: , Rfl:     chlorhexidine (Peridex) 0.12 % solution, Swish for 30 seconds and spit 15mL of solution the night before and morning of surgery, Disp: 475 mL, Rfl: 0    Current Facility-Administered Medications:     silver sulfADIAZINE (Silvadene) 1 % cream, , Topical, BID, April Beatty MD    silver sulfADIAZINE (Silvadene) 1 % cream, , Topical, BID, April Beatty MD      PAT ROS:   Constitutional:    no fever   no chills   no unexpected weight change  Neuro/Psych:    no numbness   no weakness   no light-headedness   no confusion  Eyes:    no discharge   no pain   no vision loss   no diplopia   no visual disturbance   use of corrective lenses  Ears:    no ear pain   no hearing loss   no tinnitus  Nose:    no nasal discharge   no sinus congestion   no epistaxis  Mouth:    no dental issues   no mouth pain   no oral bleeding   no mouth lesions  Throat:    no throat pain   no dysphagia  Neck:    no neck pain   no neck stiffness  Cardio:    Functional 4 Mets. Patient denies SOB walking up 1 flights of stairs   Mowing lawn, ADL,    no chest pain   no palpitations   no peripheral edema   no dyspnea   no AGUILA  Respiratory:    no cough   no wheezing   no hemoptysis   no shortness of breath  Endocrine:    no cold intolerance   no heat  intolerance  GI:    no abdominal distention   no abdominal pain   constipation   no diarrhea   no nausea   no vomiting   no blood in stool  :    no difficulty urinating   no dysuria   no oliguria  Musculoskeletal:    RUE amputation   arthralgias (LBP and BLE)   no myalgias   no decreased ROM  Hematologic:    does not bruise/bleed easily   no excessive bleeding   no history of blood transfusion   no blood clots  Skin:   no skin changes   no sores/wound   no rash      Physical Exam  Constitutional:       General: He is not in acute distress.     Appearance: Normal appearance. He is not ill-appearing, toxic-appearing or diaphoretic.   HENT:      Head: Normocephalic and atraumatic.      Nose: Nose normal. No rhinorrhea.      Mouth/Throat:      Pharynx: No oropharyngeal exudate.   Eyes:      Extraocular Movements: Extraocular movements intact.      Conjunctiva/sclera: Conjunctivae normal.   Cardiovascular:      Rate and Rhythm: Normal rate and regular rhythm.      Heart sounds: No murmur heard.     No friction rub. No gallop.      Comments: Functional 4 Mets. Patient denies SOB walking up 2 flights of stairs     Pulmonary:      Effort: Pulmonary effort is normal. No respiratory distress.      Breath sounds: Normal breath sounds. No stridor. No wheezing or rhonchi.   Abdominal:      General: Bowel sounds are normal. There is no distension.      Palpations: Abdomen is soft. There is no mass.      Tenderness: There is no abdominal tenderness. There is no guarding or rebound.      Hernia: No hernia is present.   Musculoskeletal:         General: Tenderness (pain with flexion of lumbar spine) present. No swelling, deformity or signs of injury. Normal range of motion.      Cervical back: Normal range of motion and neck supple. No rigidity or tenderness.      Comments: RUE amputation   Skin:     General: Skin is warm and dry.      Coloration: Skin is not jaundiced or pale.      Findings: No bruising, erythema, lesion or rash.  "  Neurological:      General: No focal deficit present.      Mental Status: He is alert and oriented to person, place, and time.      Cranial Nerves: No cranial nerve deficit.      Sensory: No sensory deficit.      Motor: Weakness (BLE) present.      Coordination: Coordination normal.      Gait: Gait abnormal.   Psychiatric:         Mood and Affect: Mood normal.         Behavior: Behavior normal.          PAT AIRWAY:   Airway:     Mallampati::  II    Neck ROM::  Limited   Few missing teeth; no loose teeth      Visit Vitals  BP (!) 140/91   Pulse 83   Temp 35.8 °C (96.5 °F)   Resp 18   Ht 1.816 m (5' 11.5\")   Wt 99.9 kg (220 lb 3.8 oz)   SpO2 97%   BMI 30.29 kg/m²   Smoking Status Every Day   BSA 2.24 m²      LABS:  Lab Results   Component Value Date    HGBA1C 5.2 03/06/2024      Lab Results   Component Value Date    WBC 7.6 04/23/2024    HGB 14.5 04/23/2024    HCT 42.9 04/23/2024    MCV 98 04/23/2024     04/23/2024      Lab Results   Component Value Date    GLUCOSE 91 04/23/2024    CALCIUM 9.2 04/23/2024     04/23/2024    K 4.7 04/23/2024    CO2 31 04/23/2024     04/23/2024    BUN 14 04/23/2024    CREATININE 0.85 04/23/2024      Urinalysis with Reflex Culture and Microscopic  Order: [id number] - Part of Panel Order [id number]   Status: Final result       Visible to patient: Yes (seen)       Dx: Preop testing    0 Result Notes      Component  Ref Range & Units 2 d ago   Color, Urine  Light-Yellow, Yellow, Dark-Yellow Light-Yellow   Appearance, Urine  Clear Clear   Specific Gravity, Urine  1.005 - 1.035 1.031   pH, Urine  5.0, 5.5, 6.0, 6.5, 7.0, 7.5, 8.0 5.5   Protein, Urine  NEGATIVE, 10 (TRACE), 20 (TRACE) mg/dL NEGATIVE   Glucose, Urine  Normal mg/dL Normal   Blood, Urine  NEGATIVE NEGATIVE   Ketones, Urine  NEGATIVE mg/dL NEGATIVE   Bilirubin, Urine  NEGATIVE NEGATIVE   Urobilinogen, Urine  Normal mg/dL Normal   Nitrite, Urine  NEGATIVE NEGATIVE   Leukocyte Esterase, Urine  NEGATIVE NEGATIVE "   Resulting Agency [facility]              Specimen Collected: 04/23/24 15:40 Last Resulted: 04/23/24 16:03           Coagulation Screen  Order: [id number]   Status: Final result       Visible to patient: Yes (seen)       Dx: Preop testing; SOB (shortness of ken...    0 Result Notes      Component  Ref Range & Units 1 d ago   Protime  9.8 - 12.8 seconds 11.6   INR  0.9 - 1.1 1.0   aPTT  27 - 38 seconds 35   Resulting Agency [facility]              Narrative  Performed by: [facility]  The APTT is no longer used for monitoring Unfractionated Heparin Therapy. For monitoring Heparin Therapy, use the Heparin Assay.      Specimen Collected: 04/25/24 14:25 Last Resulted: 04/25/24 20:44           Assessment and Plan:   Patient is a 63-year-old male scheduled for a L2-L3; L3-L4 Lateral Interbody Fusion; L2-L5 Decompression with Fusion; L4-L5 Transforaminal Lumbar Interbody Fusion with Dr. Collazo on 5/3/24.    Patient has no active cardiac symptoms.   Patient denies any chest pain, tightness, heaviness, pressure, radiating pain, palpitations, irregular heartbeats, lightheadedness, cough, congestion, shortness of breath, AGUILA, PND, near syncope, weight loss or gain.     RCRI  0  , 3.9 % Risk of MACE    Cardiac:  Elevated BP - during PAT today BP is 140/91  Encouraged lifestyle modifications, low-sodium diet, and increase activity as tolerated.  Monitor BP and follow up with managing physician for readings sustaining >140/90.     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   Initiate mechanical DVT prophylaxis as soon as possible and initiate chemical prophylaxis when deemed safe from a bleeding standpoint post surgery.     LABS: CBC, BMP, coag, T&S, MRSA, UA flex ordered. A1c reviewed from 3/6/24; no need to repeat.     Followup: MRSA pending    STOP BANG: >50, male =  2    Caprini: 5    Risk assessment complete.  Patient is scheduled for a intermediate to high surgical risk procedure.        Preoperative medication instructions were provided and reviewed with the patient.  Any additional testing or evaluation was explained to the patient.  Nothing by mouth instructions were discussed and patient's questions were answered prior to conclusion to this encounter.  Patient verbalized understanding of preoperative instructions given in preadmission testing; discharge instructions available in EMR.    This note was dictated by a speech recognition.  Minor errors may have been detected in a speech recognition.

## 2024-04-24 LAB — HOLD SPECIMEN: NORMAL

## 2024-04-25 ENCOUNTER — LAB (OUTPATIENT)
Dept: LAB | Facility: LAB | Age: 64
End: 2024-04-25
Payer: MEDICARE

## 2024-04-25 ENCOUNTER — TELEPHONE (OUTPATIENT)
Dept: PREADMISSION TESTING | Facility: HOSPITAL | Age: 64
End: 2024-04-25
Payer: MEDICARE

## 2024-04-25 DIAGNOSIS — Z01.818 PREOP TESTING: ICD-10-CM

## 2024-04-25 DIAGNOSIS — R06.02 SOB (SHORTNESS OF BREATH) ON EXERTION: ICD-10-CM

## 2024-04-25 LAB
APTT PPP: 35 SECONDS (ref 27–38)
INR PPP: 1 (ref 0.9–1.1)
PROTHROMBIN TIME: 11.6 SECONDS (ref 9.8–12.8)
STAPHYLOCOCCUS SPEC CULT: NORMAL

## 2024-04-25 PROCEDURE — 85730 THROMBOPLASTIN TIME PARTIAL: CPT

## 2024-04-25 PROCEDURE — 85610 PROTHROMBIN TIME: CPT

## 2024-04-25 PROCEDURE — 36415 COLL VENOUS BLD VENIPUNCTURE: CPT

## 2024-05-10 ENCOUNTER — ANESTHESIA EVENT (OUTPATIENT)
Dept: OPERATING ROOM | Facility: HOSPITAL | Age: 64
DRG: 455 | End: 2024-05-10
Payer: MEDICARE

## 2024-05-10 ENCOUNTER — APPOINTMENT (OUTPATIENT)
Dept: RADIOLOGY | Facility: HOSPITAL | Age: 64
DRG: 455 | End: 2024-05-10
Payer: MEDICARE

## 2024-05-10 ENCOUNTER — HOSPITAL ENCOUNTER (INPATIENT)
Facility: HOSPITAL | Age: 64
LOS: 4 days | Discharge: HOME | DRG: 455 | End: 2024-05-14
Attending: ORTHOPAEDIC SURGERY | Admitting: ORTHOPAEDIC SURGERY
Payer: MEDICARE

## 2024-05-10 ENCOUNTER — ANESTHESIA (OUTPATIENT)
Dept: OPERATING ROOM | Facility: HOSPITAL | Age: 64
DRG: 455 | End: 2024-05-10
Payer: MEDICARE

## 2024-05-10 DIAGNOSIS — M48.062 SPINAL STENOSIS OF LUMBAR REGION WITH NEUROGENIC CLAUDICATION: ICD-10-CM

## 2024-05-10 DIAGNOSIS — M48.062 SPINAL STENOSIS, LUMBAR REGION WITH NEUROGENIC CLAUDICATION: Primary | ICD-10-CM

## 2024-05-10 PROBLEM — E66.9 OBESITY: Status: ACTIVE | Noted: 2024-05-10

## 2024-05-10 LAB
ABO GROUP (TYPE) IN BLOOD: NORMAL
GLUCOSE BLD MANUAL STRIP-MCNC: 192 MG/DL (ref 74–99)
RH FACTOR (ANTIGEN D): NORMAL

## 2024-05-10 PROCEDURE — 22585 ARTHRD ANT NTRBD MIN DSC EA: CPT | Performed by: ORTHOPAEDIC SURGERY

## 2024-05-10 PROCEDURE — 63053 LAM FACTC/FRMT ARTHRD LUM EA: CPT | Performed by: ORTHOPAEDIC SURGERY

## 2024-05-10 PROCEDURE — 2500000001 HC RX 250 WO HCPCS SELF ADMINISTERED DRUGS (ALT 637 FOR MEDICARE OP): Performed by: ORTHOPAEDIC SURGERY

## 2024-05-10 PROCEDURE — 7100000002 HC RECOVERY ROOM TIME - EACH INCREMENTAL 1 MINUTE: Performed by: ORTHOPAEDIC SURGERY

## 2024-05-10 PROCEDURE — 63047 LAM FACETEC & FORAMOT LUMBAR: CPT | Performed by: ORTHOPAEDIC SURGERY

## 2024-05-10 PROCEDURE — 7100000001 HC RECOVERY ROOM TIME - INITIAL BASE CHARGE: Performed by: ORTHOPAEDIC SURGERY

## 2024-05-10 PROCEDURE — C1762 CONN TISS, HUMAN(INC FASCIA): HCPCS | Performed by: ORTHOPAEDIC SURGERY

## 2024-05-10 PROCEDURE — 22853 INSJ BIOMECHANICAL DEVICE: CPT | Performed by: ORTHOPAEDIC SURGERY

## 2024-05-10 PROCEDURE — A4649 SURGICAL SUPPLIES: HCPCS | Performed by: ORTHOPAEDIC SURGERY

## 2024-05-10 PROCEDURE — 2500000004 HC RX 250 GENERAL PHARMACY W/ HCPCS (ALT 636 FOR OP/ED): Performed by: ORTHOPAEDIC SURGERY

## 2024-05-10 PROCEDURE — 22842 INSERT SPINE FIXATION DEVICE: CPT | Performed by: ORTHOPAEDIC SURGERY

## 2024-05-10 PROCEDURE — 3700000001 HC GENERAL ANESTHESIA TIME - INITIAL BASE CHARGE: Performed by: ORTHOPAEDIC SURGERY

## 2024-05-10 PROCEDURE — 2500000005 HC RX 250 GENERAL PHARMACY W/O HCPCS: Performed by: ANESTHESIOLOGY

## 2024-05-10 PROCEDURE — 20985 CPTR-ASST DIR MS PX: CPT | Performed by: ORTHOPAEDIC SURGERY

## 2024-05-10 PROCEDURE — 2500000005 HC RX 250 GENERAL PHARMACY W/O HCPCS: Performed by: NURSE ANESTHETIST, CERTIFIED REGISTERED

## 2024-05-10 PROCEDURE — C1713 ANCHOR/SCREW BN/BN,TIS/BN: HCPCS | Performed by: ORTHOPAEDIC SURGERY

## 2024-05-10 PROCEDURE — 72020 X-RAY EXAM OF SPINE 1 VIEW: CPT

## 2024-05-10 PROCEDURE — 82947 ASSAY GLUCOSE BLOOD QUANT: CPT

## 2024-05-10 PROCEDURE — 2500000004 HC RX 250 GENERAL PHARMACY W/ HCPCS (ALT 636 FOR OP/ED): Performed by: ANESTHESIOLOGY

## 2024-05-10 PROCEDURE — 22558 ARTHRD ANT NTRBD MIN DSC LUM: CPT | Performed by: ORTHOPAEDIC SURGERY

## 2024-05-10 PROCEDURE — 22614 ARTHRD PST TQ 1NTRSPC EA ADD: CPT | Performed by: ORTHOPAEDIC SURGERY

## 2024-05-10 PROCEDURE — 3600000003 HC OR TIME - INITIAL BASE CHARGE - PROCEDURE LEVEL THREE: Performed by: ORTHOPAEDIC SURGERY

## 2024-05-10 PROCEDURE — 1100000001 HC PRIVATE ROOM DAILY

## 2024-05-10 PROCEDURE — 2500000005 HC RX 250 GENERAL PHARMACY W/O HCPCS: Performed by: ANESTHESIOLOGIST ASSISTANT

## 2024-05-10 PROCEDURE — 9420000001 HC RT PATIENT EDUCATION 5 MIN

## 2024-05-10 PROCEDURE — 2720000007 HC OR 272 NO HCPCS: Performed by: ORTHOPAEDIC SURGERY

## 2024-05-10 PROCEDURE — 3700000002 HC GENERAL ANESTHESIA TIME - EACH INCREMENTAL 1 MINUTE: Performed by: ORTHOPAEDIC SURGERY

## 2024-05-10 PROCEDURE — 2500000005 HC RX 250 GENERAL PHARMACY W/O HCPCS: Performed by: ORTHOPAEDIC SURGERY

## 2024-05-10 PROCEDURE — 22633 ARTHRD CMBN 1NTRSPC LUMBAR: CPT | Performed by: ORTHOPAEDIC SURGERY

## 2024-05-10 PROCEDURE — A22558 PR ARTHRODESIS ANT INTERBODY MIN DISCECTOMY,LUMBAR: Performed by: ANESTHESIOLOGY

## 2024-05-10 PROCEDURE — 76000 FLUOROSCOPY <1 HR PHYS/QHP: CPT

## 2024-05-10 PROCEDURE — 2500000004 HC RX 250 GENERAL PHARMACY W/ HCPCS (ALT 636 FOR OP/ED): Performed by: ANESTHESIOLOGIST ASSISTANT

## 2024-05-10 PROCEDURE — A22558 PR ARTHRODESIS ANT INTERBODY MIN DISCECTOMY,LUMBAR: Performed by: ANESTHESIOLOGIST ASSISTANT

## 2024-05-10 PROCEDURE — 36415 COLL VENOUS BLD VENIPUNCTURE: CPT | Performed by: ORTHOPAEDIC SURGERY

## 2024-05-10 PROCEDURE — C1821 INTERSPINOUS IMPLANT: HCPCS | Performed by: ORTHOPAEDIC SURGERY

## 2024-05-10 PROCEDURE — 63048 LAM FACETEC &FORAMOT EA ADDL: CPT | Performed by: ORTHOPAEDIC SURGERY

## 2024-05-10 PROCEDURE — 2500000004 HC RX 250 GENERAL PHARMACY W/ HCPCS (ALT 636 FOR OP/ED): Performed by: NURSE ANESTHETIST, CERTIFIED REGISTERED

## 2024-05-10 PROCEDURE — 2780000003 HC OR 278 NO HCPCS: Performed by: ORTHOPAEDIC SURGERY

## 2024-05-10 PROCEDURE — 3600000008 HC OR TIME - EACH INCREMENTAL 1 MINUTE - PROCEDURE LEVEL THREE: Performed by: ORTHOPAEDIC SURGERY

## 2024-05-10 PROCEDURE — 63052 LAM FACETC/FRMT ARTHRD LUM 1: CPT | Performed by: ORTHOPAEDIC SURGERY

## 2024-05-10 DEVICE — BONE, PUTTY DBX  5CC DE-MINERAL ASEPTIC: Type: IMPLANTABLE DEVICE | Site: SPINE LUMBAR | Status: FUNCTIONAL

## 2024-05-10 DEVICE — SCREW, MAS 6.5 X 50 CC: Type: IMPLANTABLE DEVICE | Site: SPINE LUMBAR | Status: FUNCTIONAL

## 2024-05-10 DEVICE — BONE, PUTTY DBX  10CC DE-MINERAL ASEPTIC: Type: IMPLANTABLE DEVICE | Site: SPINE LUMBAR | Status: FUNCTIONAL

## 2024-05-10 DEVICE — IMPLANTABLE DEVICE: Type: IMPLANTABLE DEVICE | Site: SPINE LUMBAR | Status: FUNCTIONAL

## 2024-05-10 DEVICE — SET SCREW, 5.5, TI NS BRK OFF: Type: IMPLANTABLE DEVICE | Site: SPINE LUMBAR | Status: FUNCTIONAL

## 2024-05-10 DEVICE — BONE CHIPS,  CANCELL 30CC 1.7-10MM: Type: IMPLANTABLE DEVICE | Site: SPINE LUMBAR | Status: FUNCTIONAL

## 2024-05-10 DEVICE — SCREW, SOLERA 5.5 MAS, 7.5X50: Type: IMPLANTABLE DEVICE | Site: SPINE LUMBAR | Status: FUNCTIONAL

## 2024-05-10 DEVICE — SCREW, MAS 6.5 X 55 CC SOLERA: Type: IMPLANTABLE DEVICE | Site: SPINE LUMBAR | Status: FUNCTIONAL

## 2024-05-10 DEVICE — SCREW, SOLERA 5.5 MAS, 7.5X55: Type: IMPLANTABLE DEVICE | Site: SPINE LUMBAR | Status: FUNCTIONAL

## 2024-05-10 DEVICE — ROD, 5.5 X 90 CVD TI SOLERA: Type: IMPLANTABLE DEVICE | Site: SPINE LUMBAR | Status: FUNCTIONAL

## 2024-05-10 DEVICE — BONE, CHIP, CANCELLOUS, FREEZE DRIED, ASEPTIC, 1.7-10 MM, 60 CC: Type: IMPLANTABLE DEVICE | Site: SPINE LUMBAR | Status: FUNCTIONAL

## 2024-05-10 RX ORDER — CEFAZOLIN 1 G/1
INJECTION, POWDER, FOR SOLUTION INTRAVENOUS AS NEEDED
Status: DISCONTINUED | OUTPATIENT
Start: 2024-05-10 | End: 2024-05-10

## 2024-05-10 RX ORDER — ONDANSETRON HYDROCHLORIDE 2 MG/ML
4 INJECTION, SOLUTION INTRAVENOUS ONCE AS NEEDED
Status: DISCONTINUED | OUTPATIENT
Start: 2024-05-10 | End: 2024-05-10 | Stop reason: HOSPADM

## 2024-05-10 RX ORDER — MIDAZOLAM HYDROCHLORIDE 1 MG/ML
INJECTION INTRAMUSCULAR; INTRAVENOUS AS NEEDED
Status: DISCONTINUED | OUTPATIENT
Start: 2024-05-10 | End: 2024-05-10

## 2024-05-10 RX ORDER — DEXTROSE 50 % IN WATER (D50W) INTRAVENOUS SYRINGE
25
Status: DISCONTINUED | OUTPATIENT
Start: 2024-05-10 | End: 2024-05-14 | Stop reason: HOSPADM

## 2024-05-10 RX ORDER — DIPHENHYDRAMINE HYDROCHLORIDE 50 MG/ML
12.5 INJECTION INTRAMUSCULAR; INTRAVENOUS EVERY 6 HOURS PRN
Status: DISCONTINUED | OUTPATIENT
Start: 2024-05-10 | End: 2024-05-14 | Stop reason: HOSPADM

## 2024-05-10 RX ORDER — HYDROMORPHONE HYDROCHLORIDE 1 MG/ML
INJECTION, SOLUTION INTRAMUSCULAR; INTRAVENOUS; SUBCUTANEOUS AS NEEDED
Status: DISCONTINUED | OUTPATIENT
Start: 2024-05-10 | End: 2024-05-10

## 2024-05-10 RX ORDER — PROPOFOL 10 MG/ML
INJECTION, EMULSION INTRAVENOUS CONTINUOUS PRN
Status: DISCONTINUED | OUTPATIENT
Start: 2024-05-10 | End: 2024-05-10

## 2024-05-10 RX ORDER — MEPERIDINE HYDROCHLORIDE 25 MG/ML
12.5 INJECTION INTRAMUSCULAR; INTRAVENOUS; SUBCUTANEOUS EVERY 10 MIN PRN
Status: DISCONTINUED | OUTPATIENT
Start: 2024-05-10 | End: 2024-05-10 | Stop reason: HOSPADM

## 2024-05-10 RX ORDER — HYDROMORPHONE HCL/0.9% NACL/PF 15 MG/30ML
PATIENT CONTROLLED ANALGESIA SYRINGE INTRAVENOUS CONTINUOUS
Status: DISCONTINUED | OUTPATIENT
Start: 2024-05-10 | End: 2024-05-13

## 2024-05-10 RX ORDER — LIDOCAINE HYDROCHLORIDE 20 MG/ML
INJECTION, SOLUTION INFILTRATION; PERINEURAL AS NEEDED
Status: DISCONTINUED | OUTPATIENT
Start: 2024-05-10 | End: 2024-05-10

## 2024-05-10 RX ORDER — CEFAZOLIN SODIUM 2 G/100ML
2 INJECTION, SOLUTION INTRAVENOUS EVERY 8 HOURS
Qty: 600 ML | Refills: 0 | Status: COMPLETED | OUTPATIENT
Start: 2024-05-10 | End: 2024-05-12

## 2024-05-10 RX ORDER — PROPOFOL 10 MG/ML
INJECTION, EMULSION INTRAVENOUS AS NEEDED
Status: DISCONTINUED | OUTPATIENT
Start: 2024-05-10 | End: 2024-05-10

## 2024-05-10 RX ORDER — METHOCARBAMOL 500 MG/1
750 TABLET, FILM COATED ORAL EVERY 8 HOURS PRN
Status: DISCONTINUED | OUTPATIENT
Start: 2024-05-10 | End: 2024-05-14 | Stop reason: HOSPADM

## 2024-05-10 RX ORDER — PANTOPRAZOLE SODIUM 40 MG/1
40 TABLET, DELAYED RELEASE ORAL
Status: DISCONTINUED | OUTPATIENT
Start: 2024-05-11 | End: 2024-05-14 | Stop reason: HOSPADM

## 2024-05-10 RX ORDER — FENTANYL CITRATE 50 UG/ML
INJECTION, SOLUTION INTRAMUSCULAR; INTRAVENOUS AS NEEDED
Status: DISCONTINUED | OUTPATIENT
Start: 2024-05-10 | End: 2024-05-10

## 2024-05-10 RX ORDER — ONDANSETRON HYDROCHLORIDE 2 MG/ML
4 INJECTION, SOLUTION INTRAVENOUS EVERY 8 HOURS PRN
Status: DISCONTINUED | OUTPATIENT
Start: 2024-05-10 | End: 2024-05-14 | Stop reason: HOSPADM

## 2024-05-10 RX ORDER — DEXTROSE 50 % IN WATER (D50W) INTRAVENOUS SYRINGE
12.5
Status: DISCONTINUED | OUTPATIENT
Start: 2024-05-10 | End: 2024-05-14 | Stop reason: HOSPADM

## 2024-05-10 RX ORDER — SODIUM CHLORIDE, SODIUM LACTATE, POTASSIUM CHLORIDE, CALCIUM CHLORIDE 600; 310; 30; 20 MG/100ML; MG/100ML; MG/100ML; MG/100ML
100 INJECTION, SOLUTION INTRAVENOUS CONTINUOUS
Status: DISCONTINUED | OUTPATIENT
Start: 2024-05-10 | End: 2024-05-10 | Stop reason: HOSPADM

## 2024-05-10 RX ORDER — NORETHINDRONE AND ETHINYL ESTRADIOL 0.5-0.035
KIT ORAL AS NEEDED
Status: DISCONTINUED | OUTPATIENT
Start: 2024-05-10 | End: 2024-05-10

## 2024-05-10 RX ORDER — BACITRACIN 500 [USP'U]/G
OINTMENT TOPICAL AS NEEDED
Status: DISCONTINUED | OUTPATIENT
Start: 2024-05-10 | End: 2024-05-10 | Stop reason: HOSPADM

## 2024-05-10 RX ORDER — POLYETHYLENE GLYCOL 3350 17 G/17G
17 POWDER, FOR SOLUTION ORAL DAILY
Status: DISCONTINUED | OUTPATIENT
Start: 2024-05-10 | End: 2024-05-14 | Stop reason: HOSPADM

## 2024-05-10 RX ORDER — PHENYLEPHRINE HCL IN 0.9% NACL 1 MG/10 ML
SYRINGE (ML) INTRAVENOUS AS NEEDED
Status: DISCONTINUED | OUTPATIENT
Start: 2024-05-10 | End: 2024-05-10

## 2024-05-10 RX ORDER — ONDANSETRON 4 MG/1
4 TABLET, ORALLY DISINTEGRATING ORAL EVERY 8 HOURS PRN
Status: DISCONTINUED | OUTPATIENT
Start: 2024-05-10 | End: 2024-05-14 | Stop reason: HOSPADM

## 2024-05-10 RX ORDER — SODIUM CHLORIDE, SODIUM LACTATE, POTASSIUM CHLORIDE, CALCIUM CHLORIDE 600; 310; 30; 20 MG/100ML; MG/100ML; MG/100ML; MG/100ML
100 INJECTION, SOLUTION INTRAVENOUS CONTINUOUS
Status: DISCONTINUED | OUTPATIENT
Start: 2024-05-10 | End: 2024-05-14 | Stop reason: HOSPADM

## 2024-05-10 RX ORDER — ROCURONIUM BROMIDE 10 MG/ML
INJECTION, SOLUTION INTRAVENOUS AS NEEDED
Status: DISCONTINUED | OUTPATIENT
Start: 2024-05-10 | End: 2024-05-10

## 2024-05-10 RX ORDER — ONDANSETRON HYDROCHLORIDE 2 MG/ML
INJECTION, SOLUTION INTRAVENOUS AS NEEDED
Status: DISCONTINUED | OUTPATIENT
Start: 2024-05-10 | End: 2024-05-10

## 2024-05-10 RX ORDER — NALOXONE HYDROCHLORIDE 0.4 MG/ML
0.2 INJECTION, SOLUTION INTRAMUSCULAR; INTRAVENOUS; SUBCUTANEOUS AS NEEDED
Status: DISCONTINUED | OUTPATIENT
Start: 2024-05-10 | End: 2024-05-14 | Stop reason: HOSPADM

## 2024-05-10 RX ORDER — OXYCODONE HYDROCHLORIDE 5 MG/1
5 TABLET ORAL EVERY 4 HOURS PRN
Status: DISCONTINUED | OUTPATIENT
Start: 2024-05-10 | End: 2024-05-10 | Stop reason: HOSPADM

## 2024-05-10 RX ORDER — LIDOCAINE HYDROCHLORIDE 10 MG/ML
0.1 INJECTION, SOLUTION EPIDURAL; INFILTRATION; INTRACAUDAL; PERINEURAL ONCE
Status: DISCONTINUED | OUTPATIENT
Start: 2024-05-10 | End: 2024-05-10 | Stop reason: HOSPADM

## 2024-05-10 RX ADMIN — PROPOFOL 200 MG: 10 INJECTION, EMULSION INTRAVENOUS at 10:07

## 2024-05-10 RX ADMIN — ROCURONIUM 10 MG: 100 INJECTION, SOLUTION INTRAVENOUS at 16:10

## 2024-05-10 RX ADMIN — EPHEDRINE SULFATE 10 MG: 50 INJECTION, SOLUTION INTRAVENOUS at 10:59

## 2024-05-10 RX ADMIN — ROCURONIUM 30 MG: 100 INJECTION, SOLUTION INTRAVENOUS at 13:48

## 2024-05-10 RX ADMIN — FENTANYL CITRATE 100 MCG: 50 INJECTION, SOLUTION INTRAMUSCULAR; INTRAVENOUS at 10:07

## 2024-05-10 RX ADMIN — HYDROMORPHONE HYDROCHLORIDE 0.5 MG: 1 INJECTION, SOLUTION INTRAMUSCULAR; INTRAVENOUS; SUBCUTANEOUS at 17:23

## 2024-05-10 RX ADMIN — Medication 100 MCG: at 15:27

## 2024-05-10 RX ADMIN — ROCURONIUM 70 MG: 100 INJECTION, SOLUTION INTRAVENOUS at 10:07

## 2024-05-10 RX ADMIN — Medication 100 MCG: at 11:04

## 2024-05-10 RX ADMIN — Medication 200 MCG: at 12:00

## 2024-05-10 RX ADMIN — ROCURONIUM 30 MG: 100 INJECTION, SOLUTION INTRAVENOUS at 10:47

## 2024-05-10 RX ADMIN — Medication 6 L/MIN: at 16:48

## 2024-05-10 RX ADMIN — Medication: at 17:35

## 2024-05-10 RX ADMIN — LIDOCAINE HYDROCHLORIDE 100 MG: 20 INJECTION, SOLUTION INFILTRATION; PERINEURAL at 10:07

## 2024-05-10 RX ADMIN — HYDROMORPHONE HYDROCHLORIDE 0.5 MG: 1 INJECTION, SOLUTION INTRAMUSCULAR; INTRAVENOUS; SUBCUTANEOUS at 15:23

## 2024-05-10 RX ADMIN — ROCURONIUM 20 MG: 100 INJECTION, SOLUTION INTRAVENOUS at 11:55

## 2024-05-10 RX ADMIN — SODIUM CHLORIDE, POTASSIUM CHLORIDE, SODIUM LACTATE AND CALCIUM CHLORIDE 100 ML/HR: 600; 310; 30; 20 INJECTION, SOLUTION INTRAVENOUS at 08:28

## 2024-05-10 RX ADMIN — CEFAZOLIN 2 G: 1 INJECTION, POWDER, FOR SOLUTION INTRAMUSCULAR; INTRAVENOUS at 14:17

## 2024-05-10 RX ADMIN — HYDROMORPHONE HYDROCHLORIDE 0.5 MG: 1 INJECTION, SOLUTION INTRAMUSCULAR; INTRAVENOUS; SUBCUTANEOUS at 15:41

## 2024-05-10 RX ADMIN — SODIUM CHLORIDE, POTASSIUM CHLORIDE, SODIUM LACTATE AND CALCIUM CHLORIDE 100 ML/HR: 600; 310; 30; 20 INJECTION, SOLUTION INTRAVENOUS at 19:00

## 2024-05-10 RX ADMIN — Medication 150 MCG: at 16:20

## 2024-05-10 RX ADMIN — MIDAZOLAM HYDROCHLORIDE 2 MG: 1 INJECTION, SOLUTION INTRAMUSCULAR; INTRAVENOUS at 09:58

## 2024-05-10 RX ADMIN — ROCURONIUM 20 MG: 100 INJECTION, SOLUTION INTRAVENOUS at 11:22

## 2024-05-10 RX ADMIN — ROCURONIUM 30 MG: 100 INJECTION, SOLUTION INTRAVENOUS at 14:36

## 2024-05-10 RX ADMIN — EPHEDRINE SULFATE 20 MG: 50 INJECTION, SOLUTION INTRAVENOUS at 13:41

## 2024-05-10 RX ADMIN — DEXAMETHASONE SODIUM PHOSPHATE 10 MG: 4 INJECTION, SOLUTION INTRAMUSCULAR; INTRAVENOUS at 10:16

## 2024-05-10 RX ADMIN — SODIUM CHLORIDE, POTASSIUM CHLORIDE, SODIUM LACTATE AND CALCIUM CHLORIDE: 600; 310; 30; 20 INJECTION, SOLUTION INTRAVENOUS at 15:29

## 2024-05-10 RX ADMIN — CEFAZOLIN 2 G: 1 INJECTION, POWDER, FOR SOLUTION INTRAMUSCULAR; INTRAVENOUS at 10:17

## 2024-05-10 RX ADMIN — ROCURONIUM 30 MG: 100 INJECTION, SOLUTION INTRAVENOUS at 12:36

## 2024-05-10 RX ADMIN — Medication 150 MCG: at 15:54

## 2024-05-10 RX ADMIN — FENTANYL CITRATE 50 MCG: 50 INJECTION, SOLUTION INTRAMUSCULAR; INTRAVENOUS at 16:23

## 2024-05-10 RX ADMIN — Medication 150 MCG: at 16:11

## 2024-05-10 RX ADMIN — FENTANYL CITRATE 50 MCG: 50 INJECTION, SOLUTION INTRAMUSCULAR; INTRAVENOUS at 11:16

## 2024-05-10 RX ADMIN — SODIUM CHLORIDE, POTASSIUM CHLORIDE, SODIUM LACTATE AND CALCIUM CHLORIDE 100 ML/HR: 600; 310; 30; 20 INJECTION, SOLUTION INTRAVENOUS at 18:53

## 2024-05-10 RX ADMIN — EPHEDRINE SULFATE 10 MG: 50 INJECTION, SOLUTION INTRAVENOUS at 13:12

## 2024-05-10 RX ADMIN — HYDROMORPHONE HYDROCHLORIDE 0.5 MG: 1 INJECTION, SOLUTION INTRAMUSCULAR; INTRAVENOUS; SUBCUTANEOUS at 17:09

## 2024-05-10 RX ADMIN — ROCURONIUM 20 MG: 100 INJECTION, SOLUTION INTRAVENOUS at 14:59

## 2024-05-10 RX ADMIN — CEFAZOLIN SODIUM 2 G: 2 INJECTION, SOLUTION INTRAVENOUS at 21:46

## 2024-05-10 RX ADMIN — ONDANSETRON 4 MG: 2 INJECTION INTRAMUSCULAR; INTRAVENOUS at 16:07

## 2024-05-10 RX ADMIN — PROPOFOL 50 MG: 10 INJECTION, EMULSION INTRAVENOUS at 16:17

## 2024-05-10 RX ADMIN — PROPOFOL 50 MCG/KG/MIN: 10 INJECTION, EMULSION INTRAVENOUS at 15:20

## 2024-05-10 RX ADMIN — Medication 150 MCG: at 15:35

## 2024-05-10 RX ADMIN — EPHEDRINE SULFATE 10 MG: 50 INJECTION, SOLUTION INTRAVENOUS at 14:44

## 2024-05-10 RX ADMIN — ROCURONIUM 30 MG: 100 INJECTION, SOLUTION INTRAVENOUS at 13:10

## 2024-05-10 RX ADMIN — SODIUM CHLORIDE, POTASSIUM CHLORIDE, SODIUM LACTATE AND CALCIUM CHLORIDE: 600; 310; 30; 20 INJECTION, SOLUTION INTRAVENOUS at 11:00

## 2024-05-10 RX ADMIN — SUGAMMADEX 200 MG: 100 INJECTION, SOLUTION INTRAVENOUS at 16:17

## 2024-05-10 SDOH — SOCIAL STABILITY: SOCIAL INSECURITY: ABUSE: ADULT

## 2024-05-10 SDOH — SOCIAL STABILITY: SOCIAL INSECURITY: WERE YOU ABLE TO COMPLETE ALL THE BEHAVIORAL HEALTH SCREENINGS?: YES

## 2024-05-10 SDOH — SOCIAL STABILITY: SOCIAL INSECURITY: DO YOU FEEL UNSAFE GOING BACK TO THE PLACE WHERE YOU ARE LIVING?: NO

## 2024-05-10 SDOH — SOCIAL STABILITY: SOCIAL INSECURITY: DOES ANYONE TRY TO KEEP YOU FROM HAVING/CONTACTING OTHER FRIENDS OR DOING THINGS OUTSIDE YOUR HOME?: NO

## 2024-05-10 SDOH — SOCIAL STABILITY: SOCIAL INSECURITY: HAS ANYONE EVER THREATENED TO HURT YOUR FAMILY OR YOUR PETS?: NO

## 2024-05-10 SDOH — SOCIAL STABILITY: SOCIAL INSECURITY: ARE THERE ANY APPARENT SIGNS OF INJURIES/BEHAVIORS THAT COULD BE RELATED TO ABUSE/NEGLECT?: NO

## 2024-05-10 SDOH — SOCIAL STABILITY: SOCIAL INSECURITY: HAVE YOU HAD THOUGHTS OF HARMING ANYONE ELSE?: NO

## 2024-05-10 SDOH — SOCIAL STABILITY: SOCIAL INSECURITY: ARE YOU OR HAVE YOU BEEN THREATENED OR ABUSED PHYSICALLY, EMOTIONALLY, OR SEXUALLY BY ANYONE?: NO

## 2024-05-10 SDOH — SOCIAL STABILITY: SOCIAL INSECURITY: DO YOU FEEL ANYONE HAS EXPLOITED OR TAKEN ADVANTAGE OF YOU FINANCIALLY OR OF YOUR PERSONAL PROPERTY?: NO

## 2024-05-10 ASSESSMENT — PAIN DESCRIPTION - DESCRIPTORS
DESCRIPTORS: SORE
DESCRIPTORS: TIGHTNESS;SHARP
DESCRIPTORS: TIGHTNESS;SHARP
DESCRIPTORS: TIGHTNESS;SORE
DESCRIPTORS: SORE
DESCRIPTORS: TIGHTNESS;SHARP

## 2024-05-10 ASSESSMENT — COLUMBIA-SUICIDE SEVERITY RATING SCALE - C-SSRS
2. HAVE YOU ACTUALLY HAD ANY THOUGHTS OF KILLING YOURSELF?: NO
6. HAVE YOU EVER DONE ANYTHING, STARTED TO DO ANYTHING, OR PREPARED TO DO ANYTHING TO END YOUR LIFE?: NO
1. IN THE PAST MONTH, HAVE YOU WISHED YOU WERE DEAD OR WISHED YOU COULD GO TO SLEEP AND NOT WAKE UP?: NO

## 2024-05-10 ASSESSMENT — COGNITIVE AND FUNCTIONAL STATUS - GENERAL
CLIMB 3 TO 5 STEPS WITH RAILING: A LOT
TURNING FROM BACK TO SIDE WHILE IN FLAT BAD: A LOT
TOILETING: A LOT
STANDING UP FROM CHAIR USING ARMS: A LOT
MOVING TO AND FROM BED TO CHAIR: A LOT
PATIENT BASELINE BEDBOUND: NO
DAILY ACTIVITIY SCORE: 14
WALKING IN HOSPITAL ROOM: A LOT
WALKING IN HOSPITAL ROOM: A LOT
DRESSING REGULAR LOWER BODY CLOTHING: A LOT
WALKING IN HOSPITAL ROOM: A LOT
PERSONAL GROOMING: A LOT
TOILETING: A LOT
CLIMB 3 TO 5 STEPS WITH RAILING: A LOT
TURNING FROM BACK TO SIDE WHILE IN FLAT BAD: A LOT
MOVING FROM LYING ON BACK TO SITTING ON SIDE OF FLAT BED WITH BEDRAILS: A LOT
CLIMB 3 TO 5 STEPS WITH RAILING: A LOT
DRESSING REGULAR UPPER BODY CLOTHING: A LOT
STANDING UP FROM CHAIR USING ARMS: A LOT
TOILETING: A LOT
MOVING FROM LYING ON BACK TO SITTING ON SIDE OF FLAT BED WITH BEDRAILS: A LOT
HELP NEEDED FOR BATHING: A LOT
MOVING TO AND FROM BED TO CHAIR: A LITTLE
HELP NEEDED FOR BATHING: A LOT
MOVING TO AND FROM BED TO CHAIR: A LOT
DAILY ACTIVITIY SCORE: 12
MOBILITY SCORE: 12
PERSONAL GROOMING: A LOT
MOVING FROM LYING ON BACK TO SITTING ON SIDE OF FLAT BED WITH BEDRAILS: A LITTLE
DRESSING REGULAR LOWER BODY CLOTHING: A LOT
STANDING UP FROM CHAIR USING ARMS: A LITTLE
DRESSING REGULAR UPPER BODY CLOTHING: A LOT
DRESSING REGULAR UPPER BODY CLOTHING: A LITTLE
PERSONAL GROOMING: A LITTLE
EATING MEALS: A LOT
HELP NEEDED FOR BATHING: A LITTLE
DAILY ACTIVITIY SCORE: 17
MOBILITY SCORE: 12
DRESSING REGULAR LOWER BODY CLOTHING: A LOT
MOBILITY SCORE: 16
TURNING FROM BACK TO SIDE WHILE IN FLAT BAD: A LITTLE

## 2024-05-10 ASSESSMENT — LIFESTYLE VARIABLES
HOW OFTEN DO YOU HAVE 6 OR MORE DRINKS ON ONE OCCASION: LESS THAN MONTHLY
SKIP TO QUESTIONS 9-10: 0
AUDIT-C TOTAL SCORE: 2
HOW OFTEN DO YOU HAVE A DRINK CONTAINING ALCOHOL: MONTHLY OR LESS
AUDIT-C TOTAL SCORE: 2
HOW MANY STANDARD DRINKS CONTAINING ALCOHOL DO YOU HAVE ON A TYPICAL DAY: 1 OR 2

## 2024-05-10 ASSESSMENT — PAIN - FUNCTIONAL ASSESSMENT
PAIN_FUNCTIONAL_ASSESSMENT: UNABLE TO SELF-REPORT
PAIN_FUNCTIONAL_ASSESSMENT: UNABLE TO SELF-REPORT
PAIN_FUNCTIONAL_ASSESSMENT: 0-10

## 2024-05-10 ASSESSMENT — PAIN SCALES - GENERAL
PAINLEVEL_OUTOF10: 10 - WORST POSSIBLE PAIN
PAINLEVEL_OUTOF10: 4
PAINLEVEL_OUTOF10: 10 - WORST POSSIBLE PAIN
PAINLEVEL_OUTOF10: 8
PAINLEVEL_OUTOF10: 0 - NO PAIN
PAINLEVEL_OUTOF10: 4
PAINLEVEL_OUTOF10: 0 - NO PAIN
PAINLEVEL_OUTOF10: 6

## 2024-05-10 ASSESSMENT — ACTIVITIES OF DAILY LIVING (ADL)
PATIENT'S MEMORY ADEQUATE TO SAFELY COMPLETE DAILY ACTIVITIES?: YES
BATHING: INDEPENDENT
ADEQUATE_TO_COMPLETE_ADL: YES
GROOMING: INDEPENDENT
JUDGMENT_ADEQUATE_SAFELY_COMPLETE_DAILY_ACTIVITIES: YES
HEARING - LEFT EAR: FUNCTIONAL
WALKS IN HOME: INDEPENDENT
FEEDING YOURSELF: INDEPENDENT
HEARING - RIGHT EAR: FUNCTIONAL
LACK_OF_TRANSPORTATION: NO
TOILETING: INDEPENDENT
DRESSING YOURSELF: INDEPENDENT

## 2024-05-10 ASSESSMENT — CHA2DS2 SCORE
SEX: MALE
AGE IN YEARS: 65-74
SEX: MALE
AGE IN YEARS: 65-74

## 2024-05-10 ASSESSMENT — PATIENT HEALTH QUESTIONNAIRE - PHQ9
2. FEELING DOWN, DEPRESSED OR HOPELESS: NOT AT ALL
1. LITTLE INTEREST OR PLEASURE IN DOING THINGS: NOT AT ALL
SUM OF ALL RESPONSES TO PHQ9 QUESTIONS 1 & 2: 0

## 2024-05-10 NOTE — H&P
"History Of Present Illness  Toño Bustamante is a 63 y.o. male presenting with severe lumbar radiculopathy  Plan Lumbar Decompression and Fusion.     Past Medical History  He has a past medical history of Carcinoma of soft tissue of arm, right (Multi), Carpal tunnel syndrome, left upper limb, Constipation, DJD (degenerative joint disease), cervical, Osteoarthritis, Osteoarthritis, Psoriasis, Spinal stenosis, lumbar region with neurogenic claudication, Stable branch retinal vein occlusion of left eye (CMS-HCC), and Wears glasses.    Surgical History  He has a past surgical history that includes Total knee arthroplasty (Bilateral); Arm amputation at shoulder (Right); Lung surgery; Colonoscopy; and Hernia repair (Right).     Social History  He reports that he has been smoking cigarettes. He started smoking about 49 years ago. He has a 36.3 pack-year smoking history. He has never used smokeless tobacco. He reports that he does not currently use alcohol. He reports that he does not currently use drugs after having used the following drugs: Marijuana.    Family History  Family History   Problem Relation Name Age of Onset    Arthritis Mother      Osteoporosis Mother          Allergies  Clobetasol, Adhesive, and Varenicline    Review of Systems     Physical Exam     Last Recorded Vitals  Blood pressure (!) 160/97, pulse 59, temperature 36.1 °C (97 °F), temperature source Temporal, resp. rate 16, height 1.854 m (6' 1\"), weight 97.7 kg (215 lb 6.2 oz), SpO2 97%.    Relevant Results      Scheduled medications     Continuous medications  lactated Ringer's, 100 mL/hr, Last Rate: 100 mL/hr (05/10/24 0828)      PRN medications    Results for orders placed or performed during the hospital encounter of 05/10/24 (from the past 24 hour(s))   VERIFY ABO/Rh Group Test   Result Value Ref Range    ABO TYPE O     Rh TYPE POS        Assessment/Plan   Principal Problem:    Spinal stenosis, lumbar region with neurogenic claudication  Active " Problems:    Obesity              I spent   minutes in the professional and overall care of this patient.       Burton Collazo MD

## 2024-05-10 NOTE — ANESTHESIA PROCEDURE NOTES
Airway  Date/Time: 5/10/2024 10:09 AM  Urgency: elective    Airway not difficult    Staffing  Performed: ASHLEY   Authorized by: Ronald Torrez MD    Performed by: ASHLEY Silva  Patient location during procedure: OR    Indications and Patient Condition  Indications for airway management: anesthesia  Spontaneous ventilation: present  Sedation level: moderate (conscious sedation)  Preoxygenated: yes  Mask difficulty assessment: 1 - vent by mask  Planned trial extubation    Final Airway Details  Final airway type: endotracheal airway      Successful airway: ETT  Cuffed: yes   Successful intubation technique: direct laryngoscopy  Endotracheal tube insertion site: oral  Blade: Nani  Blade size: #3  ETT size (mm): 7.5  Cormack-Lehane Classification: grade IIa - partial view of glottis  Placement verified by: chest auscultation and capnometry   Measured from: lips  ETT to lips (cm): 21  Number of attempts at approach: 1

## 2024-05-10 NOTE — ANESTHESIA PREPROCEDURE EVALUATION
Patient: Toño Bustamante    Procedure Information       Date/Time: 05/10/24 0930    Procedure: L2-L4 Lateral Interbody Fusion; L2-L5 Decompression with Fusion; L4-L5 Transforaminal Lumbar Interbody Fusion (Spine Lumbar)    Location: [facility] OR 07 / Virtual [facility] OR    Surgeons: Burton Collazo MD            Relevant Problems   Anesthesia (within normal limits)      Cardiac   (+) Chest pain      Pulmonary  Light smoker      Neuro   (+) Carpal tunnel syndrome, left upper limb      GI (within normal limits)      /Renal (within normal limits)      Liver (within normal limits)      Endocrine   (+) Obesity      Hematology (within normal limits)      Musculoskeletal   (+) Carpal tunnel syndrome, left upper limb   (+) DJD (degenerative joint disease), cervical   (+) Localized osteoarthritis of left hand   (+) Primary osteoarthritis of left shoulder   (+) Spinal stenosis of lumbar region with neurogenic claudication   (+) Spinal stenosis, lumbar region with neurogenic claudication      ID   (+) Tinea versicolor      Skin   (+) Tinea versicolor       Clinical information reviewed:   Tobacco  Allergies  Meds   Med Hx  Surg Hx   Fam Hx  Soc Hx        NPO Detail:  NPO/Void Status  Carbohydrate Drink Given Prior to Surgery? : N  Date of Last Liquid: 05/10/24  Time of Last Liquid: 0700  Date of Last Solid: 05/09/24  Time of Last Solid: 1900  Last Intake Type: Clear fluids  Time of Last Void: 0819         Physical Exam    Airway  Mallampati: III     Cardiovascular    Dental    Pulmonary    Abdominal            Anesthesia Plan    History of general anesthesia?: yes  History of complications of general anesthesia?: no    ASA 2     general     intravenous induction   Anesthetic plan and risks discussed with patient.

## 2024-05-10 NOTE — ANESTHESIA POSTPROCEDURE EVALUATION
Patient: Toño Bustamante    Procedure Summary       Date: 05/10/24 Room / Location: [facility] A OR 07 / [facility]    Anesthesia Start: 1002 Anesthesia Stop: 1653    Procedure: L2-L4 Lateral Interbody Fusion; L2-L5 Decompression with Fusion; L4-L5 Transforaminal Lumbar Interbody Fusion (Spine Lumbar) Diagnosis:       Spinal stenosis, lumbar region with neurogenic claudication      (Spinal stenosis, lumbar region with neurogenic claudication [M48.062])    Surgeons: Burton Collazo MD Responsible Provider: ASHLEY Silva    Anesthesia Type: general ASA Status: 2            Anesthesia Type: general    Vitals Value Taken Time   /64 05/10/24 1653   Temp 36 05/10/24 1653   Pulse 73 05/10/24 1653   Resp 17 05/10/24 1653   SpO2 99 05/10/24 1653       Anesthesia Post Evaluation    Patient location during evaluation: PACU  Patient participation: complete - patient participated  Level of consciousness: responsive to physical stimuli  Pain management: adequate  Airway patency: patent  Cardiovascular status: acceptable  Respiratory status: acceptable and face mask  Hydration status: acceptable  Postoperative Nausea and Vomiting: none    No notable events documented.

## 2024-05-11 LAB
ANION GAP SERPL CALC-SCNC: 11 MMOL/L (ref 10–20)
BUN SERPL-MCNC: 15 MG/DL (ref 6–23)
CALCIUM SERPL-MCNC: 8.4 MG/DL (ref 8.6–10.3)
CHLORIDE SERPL-SCNC: 102 MMOL/L (ref 98–107)
CO2 SERPL-SCNC: 27 MMOL/L (ref 21–32)
CREAT SERPL-MCNC: 0.79 MG/DL (ref 0.5–1.3)
EGFRCR SERPLBLD CKD-EPI 2021: >90 ML/MIN/1.73M*2
ERYTHROCYTE [DISTWIDTH] IN BLOOD BY AUTOMATED COUNT: 13.4 % (ref 11.5–14.5)
GLUCOSE SERPL-MCNC: 124 MG/DL (ref 74–99)
HCT VFR BLD AUTO: 33.2 % (ref 41–52)
HGB BLD-MCNC: 11.2 G/DL (ref 13.5–17.5)
MCH RBC QN AUTO: 32.7 PG (ref 26–34)
MCHC RBC AUTO-ENTMCNC: 33.7 G/DL (ref 32–36)
MCV RBC AUTO: 97 FL (ref 80–100)
NRBC BLD-RTO: 0 /100 WBCS (ref 0–0)
PLATELET # BLD AUTO: 244 X10*3/UL (ref 150–450)
POTASSIUM SERPL-SCNC: 4.3 MMOL/L (ref 3.5–5.3)
RBC # BLD AUTO: 3.43 X10*6/UL (ref 4.5–5.9)
SODIUM SERPL-SCNC: 136 MMOL/L (ref 136–145)
WBC # BLD AUTO: 14.7 X10*3/UL (ref 4.4–11.3)

## 2024-05-11 PROCEDURE — 85027 COMPLETE CBC AUTOMATED: CPT | Performed by: ORTHOPAEDIC SURGERY

## 2024-05-11 PROCEDURE — 97161 PT EVAL LOW COMPLEX 20 MIN: CPT | Mod: GP

## 2024-05-11 PROCEDURE — 0SG0071 FUSION OF LUMBAR VERTEBRAL JOINT WITH AUTOLOGOUS TISSUE SUBSTITUTE, POSTERIOR APPROACH, POSTERIOR COLUMN, OPEN APPROACH: ICD-10-PCS | Performed by: ORTHOPAEDIC SURGERY

## 2024-05-11 PROCEDURE — 0SG00AJ FUSION OF LUMBAR VERTEBRAL JOINT WITH INTERBODY FUSION DEVICE, POSTERIOR APPROACH, ANTERIOR COLUMN, OPEN APPROACH: ICD-10-PCS | Performed by: ORTHOPAEDIC SURGERY

## 2024-05-11 PROCEDURE — 0SG00A0 FUSION OF LUMBAR VERTEBRAL JOINT WITH INTERBODY FUSION DEVICE, ANTERIOR APPROACH, ANTERIOR COLUMN, OPEN APPROACH: ICD-10-PCS | Performed by: ORTHOPAEDIC SURGERY

## 2024-05-11 PROCEDURE — 97530 THERAPEUTIC ACTIVITIES: CPT | Mod: GP

## 2024-05-11 PROCEDURE — 82374 ASSAY BLOOD CARBON DIOXIDE: CPT | Performed by: ORTHOPAEDIC SURGERY

## 2024-05-11 PROCEDURE — 01NB0ZZ RELEASE LUMBAR NERVE, OPEN APPROACH: ICD-10-PCS | Performed by: ORTHOPAEDIC SURGERY

## 2024-05-11 PROCEDURE — 1100000001 HC PRIVATE ROOM DAILY

## 2024-05-11 PROCEDURE — 36415 COLL VENOUS BLD VENIPUNCTURE: CPT | Performed by: ORTHOPAEDIC SURGERY

## 2024-05-11 PROCEDURE — 2500000004 HC RX 250 GENERAL PHARMACY W/ HCPCS (ALT 636 FOR OP/ED): Performed by: ORTHOPAEDIC SURGERY

## 2024-05-11 PROCEDURE — 9420000001 HC RT PATIENT EDUCATION 5 MIN

## 2024-05-11 PROCEDURE — 97165 OT EVAL LOW COMPLEX 30 MIN: CPT | Mod: GO

## 2024-05-11 PROCEDURE — 2500000001 HC RX 250 WO HCPCS SELF ADMINISTERED DRUGS (ALT 637 FOR MEDICARE OP): Performed by: ORTHOPAEDIC SURGERY

## 2024-05-11 PROCEDURE — 0SB20ZZ EXCISION OF LUMBAR VERTEBRAL DISC, OPEN APPROACH: ICD-10-PCS | Performed by: ORTHOPAEDIC SURGERY

## 2024-05-11 RX ADMIN — SODIUM CHLORIDE, POTASSIUM CHLORIDE, SODIUM LACTATE AND CALCIUM CHLORIDE 100 ML/HR: 600; 310; 30; 20 INJECTION, SOLUTION INTRAVENOUS at 22:27

## 2024-05-11 RX ADMIN — ONDANSETRON 4 MG: 2 INJECTION INTRAMUSCULAR; INTRAVENOUS at 09:17

## 2024-05-11 RX ADMIN — CEFAZOLIN SODIUM 2 G: 2 INJECTION, SOLUTION INTRAVENOUS at 14:09

## 2024-05-11 RX ADMIN — PANTOPRAZOLE SODIUM 40 MG: 40 TABLET, DELAYED RELEASE ORAL at 06:21

## 2024-05-11 RX ADMIN — SODIUM CHLORIDE, POTASSIUM CHLORIDE, SODIUM LACTATE AND CALCIUM CHLORIDE 100 ML/HR: 600; 310; 30; 20 INJECTION, SOLUTION INTRAVENOUS at 12:01

## 2024-05-11 RX ADMIN — CEFAZOLIN SODIUM 2 G: 2 INJECTION, SOLUTION INTRAVENOUS at 06:21

## 2024-05-11 RX ADMIN — CEFAZOLIN SODIUM 2 G: 2 INJECTION, SOLUTION INTRAVENOUS at 22:26

## 2024-05-11 RX ADMIN — POLYETHYLENE GLYCOL 3350 17 G: 17 POWDER, FOR SOLUTION ORAL at 09:17

## 2024-05-11 ASSESSMENT — COGNITIVE AND FUNCTIONAL STATUS - GENERAL
TURNING FROM BACK TO SIDE WHILE IN FLAT BAD: A LITTLE
HELP NEEDED FOR BATHING: A LOT
WALKING IN HOSPITAL ROOM: A LOT
HELP NEEDED FOR BATHING: A LITTLE
TURNING FROM BACK TO SIDE WHILE IN FLAT BAD: A LITTLE
MOBILITY SCORE: 16
PERSONAL GROOMING: A LITTLE
DRESSING REGULAR LOWER BODY CLOTHING: TOTAL
TURNING FROM BACK TO SIDE WHILE IN FLAT BAD: A LITTLE
TOILETING: A LOT
MOVING FROM LYING ON BACK TO SITTING ON SIDE OF FLAT BED WITH BEDRAILS: A LITTLE
TURNING FROM BACK TO SIDE WHILE IN FLAT BAD: A LITTLE
HELP NEEDED FOR BATHING: A LOT
MOBILITY SCORE: 16
DRESSING REGULAR LOWER BODY CLOTHING: TOTAL
CLIMB 3 TO 5 STEPS WITH RAILING: TOTAL
DAILY ACTIVITIY SCORE: 14
MOVING TO AND FROM BED TO CHAIR: A LITTLE
CLIMB 3 TO 5 STEPS WITH RAILING: A LOT
PERSONAL GROOMING: A LITTLE
DRESSING REGULAR LOWER BODY CLOTHING: A LOT
MOVING FROM LYING ON BACK TO SITTING ON SIDE OF FLAT BED WITH BEDRAILS: A LITTLE
MOVING FROM LYING ON BACK TO SITTING ON SIDE OF FLAT BED WITH BEDRAILS: A LITTLE
TOILETING: A LOT
STANDING UP FROM CHAIR USING ARMS: A LITTLE
TURNING FROM BACK TO SIDE WHILE IN FLAT BAD: A LITTLE
MOBILITY SCORE: 16
HELP NEEDED FOR BATHING: A LOT
DAILY ACTIVITIY SCORE: 14
DRESSING REGULAR LOWER BODY CLOTHING: TOTAL
MOVING TO AND FROM BED TO CHAIR: A LITTLE
WALKING IN HOSPITAL ROOM: A LOT
CLIMB 3 TO 5 STEPS WITH RAILING: A LOT
TOILETING: A LOT
CLIMB 3 TO 5 STEPS WITH RAILING: A LOT
STANDING UP FROM CHAIR USING ARMS: A LITTLE
HELP NEEDED FOR BATHING: A LOT
WALKING IN HOSPITAL ROOM: A LOT
MOVING TO AND FROM BED TO CHAIR: A LITTLE
DRESSING REGULAR UPPER BODY CLOTHING: A LOT
TURNING FROM BACK TO SIDE WHILE IN FLAT BAD: A LITTLE
DRESSING REGULAR LOWER BODY CLOTHING: TOTAL
MOVING FROM LYING ON BACK TO SITTING ON SIDE OF FLAT BED WITH BEDRAILS: A LITTLE
DRESSING REGULAR UPPER BODY CLOTHING: A LOT
MOVING TO AND FROM BED TO CHAIR: A LITTLE
DRESSING REGULAR UPPER BODY CLOTHING: A LOT
PERSONAL GROOMING: A LITTLE
STANDING UP FROM CHAIR USING ARMS: A LITTLE
DAILY ACTIVITIY SCORE: 14
DAILY ACTIVITIY SCORE: 14
MOBILITY SCORE: 16
MOBILITY SCORE: 16
WALKING IN HOSPITAL ROOM: A LOT
CLIMB 3 TO 5 STEPS WITH RAILING: A LOT
PERSONAL GROOMING: A LITTLE
WALKING IN HOSPITAL ROOM: A LOT
TOILETING: A LOT
DRESSING REGULAR UPPER BODY CLOTHING: A LOT
HELP NEEDED FOR BATHING: A LOT
STANDING UP FROM CHAIR USING ARMS: A LITTLE
HELP NEEDED FOR BATHING: A LOT
TURNING FROM BACK TO SIDE WHILE IN FLAT BAD: A LITTLE
TOILETING: A LOT
DRESSING REGULAR LOWER BODY CLOTHING: A LOT
DRESSING REGULAR UPPER BODY CLOTHING: A LOT
MOBILITY SCORE: 16
PERSONAL GROOMING: A LITTLE
MOVING FROM LYING ON BACK TO SITTING ON SIDE OF FLAT BED WITH BEDRAILS: A LITTLE
DAILY ACTIVITIY SCORE: 14
TOILETING: A LOT
PERSONAL GROOMING: A LITTLE
HELP NEEDED FOR BATHING: A LOT
CLIMB 3 TO 5 STEPS WITH RAILING: A LOT
DAILY ACTIVITIY SCORE: 15
WALKING IN HOSPITAL ROOM: A LOT
DRESSING REGULAR UPPER BODY CLOTHING: A LITTLE
MOVING TO AND FROM BED TO CHAIR: A LITTLE
DRESSING REGULAR UPPER BODY CLOTHING: A LOT
MOVING FROM LYING ON BACK TO SITTING ON SIDE OF FLAT BED WITH BEDRAILS: A LITTLE
STANDING UP FROM CHAIR USING ARMS: A LITTLE
CLIMB 3 TO 5 STEPS WITH RAILING: A LOT
PERSONAL GROOMING: A LITTLE
MOVING TO AND FROM BED TO CHAIR: A LITTLE
DRESSING REGULAR LOWER BODY CLOTHING: TOTAL
MOBILITY SCORE: 17
STANDING UP FROM CHAIR USING ARMS: A LITTLE
DRESSING REGULAR LOWER BODY CLOTHING: TOTAL
TOILETING: A LOT
DAILY ACTIVITIY SCORE: 14
PERSONAL GROOMING: A LITTLE
WALKING IN HOSPITAL ROOM: A LITTLE
MOBILITY SCORE: 16
MOVING FROM LYING ON BACK TO SITTING ON SIDE OF FLAT BED WITH BEDRAILS: A LITTLE
CLIMB 3 TO 5 STEPS WITH RAILING: A LOT
TURNING FROM BACK TO SIDE WHILE IN FLAT BAD: A LITTLE
TOILETING: A LOT
DRESSING REGULAR UPPER BODY CLOTHING: A LOT
STANDING UP FROM CHAIR USING ARMS: A LITTLE
MOVING TO AND FROM BED TO CHAIR: A LITTLE
WALKING IN HOSPITAL ROOM: A LOT
STANDING UP FROM CHAIR USING ARMS: A LITTLE
MOVING TO AND FROM BED TO CHAIR: A LITTLE
DAILY ACTIVITIY SCORE: 17

## 2024-05-11 ASSESSMENT — PAIN SCALES - GENERAL
PAINLEVEL_OUTOF10: 4
PAINLEVEL_OUTOF10: 0 - NO PAIN
PAINLEVEL_OUTOF10: 0 - NO PAIN
PAINLEVEL_OUTOF10: 4
PAINLEVEL_OUTOF10: 0 - NO PAIN

## 2024-05-11 ASSESSMENT — PAIN - FUNCTIONAL ASSESSMENT
PAIN_FUNCTIONAL_ASSESSMENT: 0-10

## 2024-05-11 ASSESSMENT — ACTIVITIES OF DAILY LIVING (ADL)
ADL_ASSISTANCE: INDEPENDENT
ADL_ASSISTANCE: INDEPENDENT
BATHING_ASSISTANCE: MAXIMAL

## 2024-05-11 NOTE — CARE PLAN
The patient's goals for the shift include      The clinical goals for the shift include be comfortable      Problem: Pain  Goal: My pain/discomfort is manageable  Outcome: Progressing     Problem: Safety  Goal: Patient will be injury free during hospitalization  Outcome: Progressing  Goal: I will remain free of falls  Outcome: Progressing     Problem: Daily Care  Goal: Daily care needs are met  Outcome: Progressing     Problem: Psychosocial Needs  Goal: Demonstrates ability to cope with hospitalization/illness  Outcome: Progressing  Goal: Collaborate with me, my family, and caregiver to identify my specific goals  Outcome: Progressing     Problem: Discharge Barriers  Goal: My discharge needs are met  Outcome: Progressing

## 2024-05-11 NOTE — PROGRESS NOTES
"Toño Bustamante is a 63 y.o. male on day 1 of admission presenting with Spinal stenosis, lumbar region with neurogenic claudication.    Subjective       POD 1    Comfortable with PCA  Pre-op radicular pain improved  Afeb vss  Hct 33  Adequate urine o/p  Strength intact    Stable  Mobilize         Objective     Physical Exam    Last Recorded Vitals  Blood pressure 110/68, pulse 78, temperature 36.4 °C (97.5 °F), temperature source Temporal, resp. rate 18, height 1.854 m (6' 1\"), weight 97.7 kg (215 lb 6.2 oz), SpO2 99%.  Intake/Output last 3 Shifts:  I/O last 3 completed shifts:  In: 4000 (40.9 mL/kg) [I.V.:3800 (38.9 mL/kg); IV Piggyback:200]  Out: 1590 (16.3 mL/kg) [Urine:1050 (0.3 mL/kg/hr); Drains:390; Blood:150]  Weight: 97.7 kg     Relevant Results      Scheduled medications  ceFAZolin, 2 g, intravenous, q8h  pantoprazole, 40 mg, oral, Daily before breakfast  polyethylene glycol, 17 g, oral, Daily      Continuous medications  HYDROmorphone,   lactated Ringer's, 100 mL/hr, Last Rate: 100 mL/hr (05/10/24 1853)  lactated Ringer's, 100 mL/hr, Last Rate: 100 mL/hr (05/11/24 1201)      PRN medications  PRN medications: dextrose, dextrose, diphenhydrAMINE, glucagon, glucagon, methocarbamol, naloxone, ondansetron ODT **OR** ondansetron, oxygen  Results for orders placed or performed during the hospital encounter of 05/10/24 (from the past 24 hour(s))   POCT GLUCOSE   Result Value Ref Range    POCT Glucose 192 (H) 74 - 99 mg/dL   CBC   Result Value Ref Range    WBC 14.7 (H) 4.4 - 11.3 x10*3/uL    nRBC 0.0 0.0 - 0.0 /100 WBCs    RBC 3.43 (L) 4.50 - 5.90 x10*6/uL    Hemoglobin 11.2 (L) 13.5 - 17.5 g/dL    Hematocrit 33.2 (L) 41.0 - 52.0 %    MCV 97 80 - 100 fL    MCH 32.7 26.0 - 34.0 pg    MCHC 33.7 32.0 - 36.0 g/dL    RDW 13.4 11.5 - 14.5 %    Platelets 244 150 - 450 x10*3/uL   Basic metabolic panel   Result Value Ref Range    Glucose 124 (H) 74 - 99 mg/dL    Sodium 136 136 - 145 mmol/L    Potassium 4.3 3.5 - 5.3 " mmol/L    Chloride 102 98 - 107 mmol/L    Bicarbonate 27 21 - 32 mmol/L    Anion Gap 11 10 - 20 mmol/L    Urea Nitrogen 15 6 - 23 mg/dL    Creatinine 0.79 0.50 - 1.30 mg/dL    eGFR >90 >60 mL/min/1.73m*2    Calcium 8.4 (L) 8.6 - 10.3 mg/dL                            Assessment/Plan   Principal Problem:    Spinal stenosis, lumbar region with neurogenic claudication  Active Problems:    Obesity            I spent   minutes in the professional and overall care of this patient.      Burton Collazo MD

## 2024-05-11 NOTE — CARE PLAN
The patient's goals for the shift include      The clinical goals for the shift include Patient will ambulate and remain comfortable throughout the shift.    Over the shift, the patient did not make progress toward the following goals. Barriers to progression include L2-L4 lateral interbody fusion, L2-L5 decompression with fusion, L4-L5 transforminal lumbar interbody fusion. Recommendations to address these barriers include ambulate and pain meds as needed.

## 2024-05-11 NOTE — PROGRESS NOTES
Physical Therapy    Physical Therapy Evaluation & Treatment    Patient Name: Toño Bustamante  MRN: [id number]  Today's Date: 5/11/2024   Time Calculation  Start Time: 1328  Stop Time: 1353  Time Calculation (min): 25 min    Assessment/Plan Pt is a 62 yo male s/p L2-L4 interbody fusion. Prior to admission pt was independent with mobility and quad cane use, secondary to RUE amputation. Pt presents with limited mobility due to weakness, orthopedic restrictions, and pain. Pt may benefit from PT services at this time for strengthening and mobility training to improve independence with functional mobility to return towards prior level of function.  PT Assessment  PT Assessment Results: Decreased strength, Decreased mobility, Orthopedic restrictions, Pain  Rehab Prognosis: Good  Evaluation/Treatment Tolerance: Patient limited by pain  Medical Staff Made Aware: Yes  Strengths: Ability to acquire knowledge, Physical health, Support and attitude of living partners  Barriers to Participation:  (Unable to use FWW, quad cane use in LUE)  End of Session Communication: Bedside nurse  End of Session Patient Position: Up in chair, Alarm on   IP OR SWING BED PT PLAN  Inpatient or Swing Bed: Inpatient  PT Plan  Treatment/Interventions: Bed mobility, Transfer training, Gait training, Stair training, Strengthening, Therapeutic exercise, Therapeutic activity, Endurance training, Home exercise program  PT Plan: Skilled PT  PT Frequency: 5 times per week  PT Discharge Recommendations: High intensity level of continued care  Equipment Recommended upon Discharge:  (Quad cane)  PT Recommended Transfer Status: Contact guard  PT - OK to Discharge: Yes      Subjective     General Visit Information:  General  Reason for Referral: L2-L4  Lateral Interbody Fusion  Referred By: Burton Collazo MD  Past Medical History Relevant to Rehab: Carcinoma of soft tissue of arm, right (Multi), Carpal tunnel syndrome, left upper limb, Constipation, DJD  (degenerative joint disease), cervical, Osteoarthritis, Osteoarthritis, Psoriasis, Spinal stenosis, lumbar region with neurogenic claudication, Stable branch retinal vein occlusion of left eye (CMS-HCC), and Wears glasses.  Prior to Session Communication: Bedside nurse  Patient Position Received: Up in chair, Alarm on  General Comment: Amputation of RUSAMUEL ~ 40 years ago  Home Living:  Home Living  Type of Home:  (2 story home with 2 steps, bed/bath on 2nd floor with 8 steps, tub/shower combo, std ht toilet)  Lives With: Spouse  Home Adaptive Equipment:  (Cane, shower seat, grab bars)  Prior Level of Function:  Prior Function Per Pt/Caregiver Report  Level of [place]: Independent with ADLs and functional transfers  Receives Help From: Family  ADL Assistance: Independent  Homemaking Assistance: Independent  Ambulatory Assistance: Independent (Quad cane LUE)  Precautions:  Precautions  Medical Precautions: Fall precautions  Post-Surgical Precautions: Spinal precautions       Objective   Pain:  Pain Assessment  Pain Assessment: 0-10  Pain Score: 4  Pain Type: Surgical pain  Pain Location: Back  Pain Orientation: Lower  Pain Interventions: Medication (See MAR)  Cognition:  Cognition  Overall Cognitive Status: Within Functional Limits    General Assessments:  General Observation  General Observation: IV, dawkins catheter, surgical drain    Activity Tolerance  Endurance: Tolerates 10 - 20 min exercise with multiple rests      Strength  Strength Comments: BLE WFL  Strength  Strength Comments: [facility]       Functional Assessments:  Bed Mobility  Bed Mobility: No    Transfers  Transfer: Yes  Transfer 1  Transfer From 1: Sit to, Chair with arms to  Transfer to 1: Stand  Technique 1: Sit to stand  Transfer Device 1:  (Quad cane LUE)  Transfer Level of Assistance 1: Contact guard  Trials/Comments 1: Verbal cues for positioning  Transfers 2  Transfer From 2: Stand to  Transfer to 2: Sit, Chair with arms  Technique 2: Stand to  sit  Transfer Device 2:  (Quad cane LUE)  Transfer Level of Assistance 2: Contact guard  Trials/Comments 2: Verbal cues for positioning    Ambulation/Gait Training  Ambulation/Gait Training Performed: Yes  Ambulation/Gait Training 1  Surface 1: Level tile  Device 1:  (Quad cane LUE)  Assistance 1: Contact guard  Quality of Gait 1:  (Decreased step length, decreased riana)  Comments/Distance (ft) 1: 20'       Treatments:    Bed Mobility  Bed Mobility: No    Ambulation/Gait Training  Ambulation/Gait Training Performed: Yes  Ambulation/Gait Training 1  Surface 1: Level tile  Device 1:  (Quad cane LUE)  Assistance 1: Contact guard  Quality of Gait 1:  (Decreased step length, decreased riana)  Comments/Distance (ft) 1: 20'  Transfers  Transfer: Yes  Transfer 1  Transfer From 1: Sit to, Chair with arms to  Transfer to 1: Stand  Technique 1: Sit to stand  Transfer Device 1:  (Quad cane LUE)  Transfer Level of Assistance 1: Contact guard  Trials/Comments 1: Verbal cues for positioning  Transfers 2  Transfer From 2: Stand to  Transfer to 2: Sit, Chair with arms  Technique 2: Stand to sit  Transfer Device 2:  (Quad cane LUE)  Transfer Level of Assistance 2: Contact guard  Trials/Comments 2: Verbal cues for positioning  Outcome Measures:  [facility] Basic Mobility  Turning from your back to your side while in a flat bed without using bedrails: A little  Moving from lying on your back to sitting on the side of a flat bed without using bedrails: A little  Moving to and from bed to chair (including a wheelchair): A little  Standing up from a chair using your arms (e.g. wheelchair or bedside chair): A little  To walk in hospital room: A little  Climbing 3-5 steps with railing: A lot  Basic Mobility - Total Score: 17    Encounter Problems       Encounter Problems (Active)       Mobility       STG - Patient will ambulate at least 50' with quad cane, close supervision       Start:  05/11/24    Expected End:  05/25/24            STG -  Patient will ascend and descend four to six stairs with HR use, close supervision       Start:  05/11/24    Expected End:  05/25/24               PT Transfers       STG - Patient will perform bed mobility mod I        Start:  05/11/24    Expected End:  05/25/24            STG - Patient will transfer sit to and from stand with quad cane, distant supervision       Start:  05/11/24    Expected End:  05/25/24               Safety       LTG - Patient will adhere to spinal precautions during all ADL's and transfers        Start:  05/11/24    Expected End:  05/25/24                   Education Documentation  Precautions, taught by Faith Edmondson, PT at 5/11/2024  2:06 PM.  Learner: Patient  Readiness: Acceptance  Method: Explanation  Response: Verbalizes Understanding, Demonstrated Understanding  Comment: Spinal precautions    Body Mechanics, taught by Faith Edmondson PT at 5/11/2024  2:06 PM.  Learner: Patient  Readiness: Acceptance  Method: Explanation  Response: Verbalizes Understanding, Demonstrated Understanding  Comment: Spinal precautions    Mobility Training, taught by Faith Edmondson PT at 5/11/2024  2:06 PM.  Learner: Patient  Readiness: Acceptance  Method: Explanation  Response: Verbalizes Understanding, Demonstrated Understanding  Comment: Spinal precautions    Education Comments  No comments found.

## 2024-05-11 NOTE — PROGRESS NOTES
Occupational Therapy    Evaluation    Patient Name: Toño Bustamante  MRN: [id number]  Today's Date: 5/11/2024  Time Calculation  Start Time: 0850  Stop Time: 0910  Time Calculation (min): 20 min    Assessment  IP OT Assessment  OT Assessment:  (OT Eval complete, patient is weak and requires increased assist with ADLs and transfers. Due to not being able to cross legs from seated position, extensive assist with LB ADls requried. Patient would benefit from moderate intensity therapy.)  Prognosis: Good  Barriers to Discharge: Inaccessible home environment  Evaluation/Treatment Tolerance: Patient limited by fatigue  Medical Staff Made Aware: Yes  End of Session Communication: Bedside nurse  End of Session Patient Position: Bed, 3 rail up, Alarm on  Plan:  Treatment Interventions: ADL retraining, Functional transfer training, Endurance training, Patient/family training, Neuromuscular reeducation  OT Frequency: 3 times per week  OT Discharge Recommendations: Moderate intensity level of continued care  Equipment Recommended upon Discharge:  (Hip Kit)  OT - OK to Discharge: Yes (per POC)    Subjective   Current Problem:  1. Spinal stenosis, lumbar region with neurogenic claudication  FL less than 1 hour    XR lumbar spine 1 view    XR lumbar spine 1 view    XR lumbar spine 1 view    FL less than 1 hour    XR lumbar spine 1 view    XR lumbar spine 1 view    XR lumbar spine 1 view        General:  General  Reason for Referral: L2-L4  Lateral Interbody Fusion  Referred By:  (Zay)  Past Medical History Relevant to Rehab:   Past Medical History:   Diagnosis Date    Carcinoma of soft tissue of arm, right (Multi)     amputation with chemo    Carpal tunnel syndrome, left upper limb     Constipation     DJD (degenerative joint disease), cervical     Osteoarthritis     Osteoarthritis     Psoriasis     Spinal stenosis, lumbar region with neurogenic claudication     Stable branch retinal vein occlusion of left eye (CMS-HCC)      Wears glasses        Prior to Session Communication: Bedside nurse  Patient Position Received: Bed, 3 rail up, Alarm on  General Comment:  (Patient is s/p lumbar spine surgery, increased assist required with all transitional movements. Extensive assist required for all ADLs.)  Precautions:  Medical Precautions: Fall precautions  Post-Surgical Precautions: Spinal precautions  Vital Signs:     Pain:  Pain Assessment  Pain Assessment: 0-10  Pain Score: 4  Pain Type: Surgical pain  Pain Location: Back    Objective   Cognition:  Overall Cognitive Status: Within Functional Limits  Orientation Level: Oriented X4     Home Living:  Type of Home:  (2 story home with 2 steps, bed/bath on 2nd floor with 8 steps, tub/shower combo, std ht toilet.)  Lives With: Spouse  Home Adaptive Equipment:  (Cane, shower seat, grab bars)   Prior Function:  Level of [place]: Independent with ADLs and functional transfers  Receives Help From: Family  ADL Assistance: Independent  Homemaking Assistance: Independent  Ambulatory Assistance: Independent (Cane)  Hand Dominance: Right  IADL History:  Homemaking Responsibilities: Yes  Meal Prep Responsibility: Secondary  ADL:  Eating Assistance: Stand by  Grooming Assistance: Minimal  Bathing Assistance: Maximal (Assist to bathe below knees)  UE Dressing Assistance: Maximal  LE Dressing Assistance: Total (Unable to cross legs to don/doff socks.)  Toileting Assistance with Device: Maximal  Functional Assistance: Maximal  Activity Tolerance:  Endurance: Tolerates 10 - 20 min exercise with multiple rests  Activity Tolerance Comments:  (Fair Activity tolerance.)  Bed Mobility/Transfers: Bed Mobility  Bed Mobility: Yes  Bed Mobility 1  Bed Mobility 1: Supine to sitting  Level of Assistance 1: Minimum assistance  Bed Mobility Comments 1:  (via log roll)    Transfers  Transfer: Yes  Transfer 1  Transfer From 1: Bed to  Transfer to 1: Chair with arms  Technique 1: Sit to stand  Transfer Device 1: Cane  (Quad Cane)  Transfer Level of Assistance 1: Minimum assistance  Trials/Comments 1:  (Cues for hand placement)    Sitting Balance:  Static Sitting Balance  Static Sitting-Comment/Number of Minutes:  (Supervision)  Dynamic Sitting Balance  Dynamic Sitting-Comments:  (CGA required)  Standing Balance:  Static Standing Balance  Static Standing-Comment/Number of Minutes:  (Min A required.)  Dynamic Standing Balance  Dynamic Standing-Comments:  (Min A required.)    IADL's:   Homemaking Responsibilities: Yes  Meal Prep Responsibility: Secondary    Strength:  Strength Comments:  (L UEs- N/E due to lumbar spine surgery)    Hand Function:  Hand Function  Gross Grasp: Functional  Coordination: Functional    Outcome Measures: [facility] Daily Activity  Putting on and taking off regular lower body clothing: Total  Bathing (including washing, rinsing, drying): A lot  Putting on and taking off regular upper body clothing: A lot  Toileting, which includes using toilet, bedpan or urinal: A lot  Taking care of personal grooming such as brushing teeth: A little  Eating Meals: None  Daily Activity - Total Score: 14      Education Documentation  Handouts, taught by Miguel Yap OT at 5/11/2024  9:55 AM.  Learner: Patient  Readiness: Acceptance  Method: Explanation  Response: Verbalizes Understanding  Comment: Patient educated regarding lumbar spine precautions, handout provided.    Precautions, taught by Miguel Yap OT at 5/11/2024  9:55 AM.  Learner: Patient  Readiness: Acceptance  Method: Explanation  Response: Verbalizes Understanding  Comment: Patient educated regarding lumbar spine precautions, handout provided.    ADL Training, taught by Miguel Yap OT at 5/11/2024  9:55 AM.  Learner: Patient  Readiness: Acceptance  Method: Explanation  Response: Verbalizes Understanding  Comment: Patient educated regarding lumbar spine precautions, handout provided.    Goals:   Encounter Problems       Encounter Problems  (Active)       ADLs       Patient will perform UB and LB bathing with independent level of assistance.        Start:  05/11/24    Expected End:  05/25/24            Patient with complete upper body dressing with supervision level of assistance donning and doffing all UE clothes with no adaptive equipment while edge of bed        Start:  05/11/24    Expected End:  05/25/24            Patient with complete lower body dressing with supervision level of assistance donning and doffing all LE clothes  with reacher, shoe horn, and sock-aid while edge of bed        Start:  05/11/24    Expected End:  05/25/24            Patient will complete daily grooming tasks brushing teeth and washing face/hair with supervision level of assistance and PRN adaptive equipment while standing.       Start:  05/11/24    Expected End:  05/25/24            Patient will complete toileting including hygiene clothing management/hygiene with supervision level of assistance and raised toilet seat.       Start:  05/11/24    Expected End:  05/25/24               BALANCE       Pt will maintain dynamic standing balance during ADL task with supervision level of assistance in order to demonstrate decreased risk of falling and improved postural control.       Start:  05/11/24    Expected End:  05/25/24               COGNITION/SAFETY       Patient to demo good understanding of Lumbar spine precautions with min verbal cues.        Start:  05/11/24    Expected End:  05/25/24               TRANSFERS       Patient will complete functional transfer to all surfaces with cane with modified independent level of assistance.       Start:  05/11/24    Expected End:  05/25/24

## 2024-05-11 NOTE — OP NOTE
L2-L4 Lateral Interbody Fusion; L2-L5 Decompression with Fusion; L4-L5 Transforaminal Lumbar Interbody Fusion Operative Note     Date: 5/10/2024  OR Location: [facility] A OR    Name: Toño Bustamante : 1960, Age: 63 y.o., MRN: [id number], Sex: male    Diagnosis  Pre-op Diagnosis     * Spinal stenosis, lumbar region with neurogenic claudication [M48.062] Post-op Diagnosis     * Spinal stenosis, lumbar region with neurogenic claudication [M48.062]     Procedures  Lateral L2-3 and L3-4 discectomy and fusion  L2-5 Decompression  L2-5 Posterolateral Fusion  L4-5 TLIF  Surgical Navigation  Surgeons      * Burton Collazo - Primary    Resident/Fellow/Other Assistant:  Surgeons and Role:  * No surgeons found with a matching role *    Procedure Summary  Anesthesia: General  ASA: II  Anesthesia Staff: Anesthesiologist: Ronald Torrez MD; Felix Monson MD  CRNA: HAILEY Keen-CRNA  C-AA: ASHLEY Silva  Estimated Blood Loss: 125 mL  Intra-op Medications:   Administrations occurring from 0930 to 1500 on 05/10/24:   Medication Name Total Dose   polymyxin B 500,000 Units in sodium chloride 0.9% 1,000 mL irrigation 500,000 Units   lactated Ringer's infusion 400 mL              Anesthesia Record               Intraprocedure I/O Totals          Intake    Propofol Drip 0.00 mL    The total shown is the total volume documented since Anesthesia Start was filed.    lactated Ringer's infusion 2700.00 mL    Total Intake 2700 mL       Output    Urine 650 mL    Est. Blood Loss 150 mL    Total Output 800 mL       Net    Net Volume 1900 mL          Specimen: No specimens collected     Staff:   Circulator: Juve Loya RN  Relief Circulator: Brigida Ulrich RN  Relief Scrub: Rigo Minor  Scrub Person: Omar Yap RN         Drains and/or Catheters:   Closed/Suction Drain Inferior;Midline Back 15 Fr. (Active)   Site Description Healing 24   Dressing Status Clean;Dry;Occlusive 24   Drainage  Appearance Bloody 05/10/24 2152   Status To bulb suction 05/10/24 2152   Output (mL) 110 mL 05/10/24 2152       Urethral Catheter Non-latex 16 Fr. (Active)   Site Assessment Clean;Skin intact 05/11/24 1013   Collection Container Standard drainage bag 05/10/24 2205   Securement Method Securing device (Describe) 05/10/24 2205   Reason for Continuing Urinary Catheterization surgical procedures: urological/gynecological, pelvic oncology, anal, prolonged surgical procedure 05/11/24 1040   Output (mL) 400 mL 05/11/24 0628       Tourniquet Times:         Implants:  Implants       Type Name Action Serial No.      Implant BONE, PUTTY DBX  10CC DE-MINERAL ASEPTIC - [id number] - [id number] Implanted [id number]     Cage MEDTRONIC SOFAMOR DANEK SIZE 6 DEG, 10mm x 50mm, 18mm Implanted N/A     Spinal Hardware CAGE, 6D 8X50 - SN/A - [id number] Implanted N/A     Implant BONE, PUTTY DBX  5CC DE-MINERAL ASEPTIC - [id number] - [id number] Implanted [id number]     Graft BONE, CHIP, CANCELLOUS, FREEZE DRIED, ASEPTIC, 1.7-10 MM, 60 CC - [id number] - [id number] Implanted [id number]     Graft BONE CHIPS,  CANCELL 30CC 1.7-10MM - [id number] - [id number] Implanted [id number]     Screw SET SCREW, 5.5, TI NS BRK OFF - SN/A - [id number] Implanted N/A     Screw SCREW, MAS 6.5 X 50 CC - SN/A - [id number] Implanted N/A     Spinal Hardware SCREW, MAS 6.5 X 55 CC SOLERA - SN/A - [id number] Implanted N/A     Spinal Hardware SCREW, SOLERA 5.5 MAS, 7.5X55 - SN/A - [id number] Implanted N/A     Spinal Hardware SCREW, SOLERA 5.5 MAS, 7.5X50 - SN/A - [id number] Implanted N/A      MEDTRONIC SPACER SIZE 24mm x 9mm Implanted N/A     Spinal Hardware LE, 5.5 X 100 CVD TI SOLERA - SN/A - [id number] Implanted N/A     Spinal Hardware LE, 5.5 X 90 CVD TI SOLERA - SN/A - [id number] Implanted N/A            Note: This man has developed disabling lumbar radiculopathy and neurogenic claudication.    He has a coronal  plane deformity, spondylolisthesis, and severe multilevel spinal stenosis.    Conservative measures have failed to relieve his symptoms.  Currently he presents for surgery in the form of a lateral interbody fusion and a posterior decompression and instrumented fusion.  The rationale for the above-noted procedure has been discussed at length as have the risks benefits expectations limitations alternatives and potential complications.  He understands and wishes to proceed.    The patient was brought to the operating room.  Huddle was held, he was identified, antibiotics administered, sequential compression devices placed.  A general anesthetic was secured.  A Alfonso catheter was placed.  He was initially turned into the right lateral decubitus position with the table slightly flexed.  The beanbag was deflated.  Great care was taken to pad all bony prominences.  His left flank was prepped and draped in a sterile fashion.  A small incision was made on his left flank and carried down sharply through skin and subcutaneous tissue.  The muscular layers were carefully dissected in line with the skin incision.  The retroperitoneal space was carefully entered in a blunt fashion.  The psoas muscle was identified and very carefully dissected posteriorly with only blunt dissection.  No electrocautery was used.  A probe was placed within the disc space and x-ray confirmed appropriate levels.  Sequentially, discectomies were performed at both L2-3 and L3-4.  Employing curettes joan and trials we prepared each disc space and then placed structural graft filled with demineralized bone matrix at each level with solid purchase.  Fluoroscopy in both planes confirmed appropriate position.  At the L3-4 disc space, the cage was within the anterior third of the disc space within a slight pre-existing depression in the superior aspect of the L4 body.  The graft itself was quite stable.  The wound was copiously irrigated.  Meticulous  hemostasis obtained.  This wound was closed in layers.  Carefully the patient was turned into the supine position.  He was then transferred into the prone position on the Kt frame again with all body prominences well-padded.  His back was prepped and draped in a sterile fashion.  A midline approach was made and carried down sharply through skin and subcutaneous tissue.  A subperiosteal dissection was carried out to the tips of the transverse processes L2-5.  Radiograph confirmed appropriate levels.  Markedly hypertrophic changes were noted about the facet joint.  The spinous processes of L4 and 3 were trimmed and the lamina thinned with a Leksell rongeur. A plane was created between the underlying dura and the overlying hypertrophic ligamentum flavum and bone.  A central decompression was performed.  Severe central and lateral recess stenosis was noted at L4-5 and L3-4.  The inferior facets of L3 and 4 were taken down completely to allow for lateral recess and subsequently foraminal decompression.  A partial laminectomy on the inferior aspect of L2 was performed so that there was no ongoing central or lateral recess stenosis.  The L1-2 interspace was protected and the L1-2 facet joints in particular were carefully preserved during the exposure and the subsequent pedicle screw instrumentation placement.  The local bone was cleaned of all soft tissue and morselized with allograft chips and a bone mill.  The wound was copiously irrigated.  The transverse processes were decorticated with a high-speed bur and the bone graft mixture along with demineralized bone matrix was packed out liberally on either side.    The O-arm was brought in.  We obtained surgical navigation guidance and employed this to place pedicle screws bilaterally at L2-5.  Appropriate purchase was noted.  A temporary ulisses was placed on the right at L4-5 and with appropriate distraction we opened the L4-5 disc space.  Carefully on the left, the L5  nerve root and thecal sac were retracted medially to reveal the disc space.  A thorough discectomy at L4-5 was performed with curettes trials and joan.  A 9 mm static cage trial was placed and fit well.  The real implant filled with demineralized bone matrix was impacted into position again with solid purchase.  The traction was let off across the right-sided screws.  Radiograph confirmed appropriate position.  Final rods were placed on either side from L2-5 and the locking mechanism tightened.  Again the wound was copiously irrigated.  Very meticulous hemostasis was obtained.  A Hemovac drain was placed deep to the fascia.  The deep fascia was closed with interrupted #1 Vicryl's, the subcutaneous tissue with interrupted 2-0 Vicryl's and the skin with a 4-0 Vicryl in a running subcuticular fashion.  Steri-Strips and a dry sterile dressing were applied.  He was carefully turned into the supine position on his hospital bed, awakened and extubated.  He was transferred to the recovery room in stable condition.    ** Dictated with voice recognition software and not immediately reviewed for errors in grammar and/or spelling *              Attending Attestation: I was present and scrubbed for the entire procedure.    Burton Collazo  Phone Number: [phone]

## 2024-05-12 LAB
ANION GAP SERPL CALC-SCNC: 13 MMOL/L (ref 10–20)
BUN SERPL-MCNC: 9 MG/DL (ref 6–23)
CALCIUM SERPL-MCNC: 8.3 MG/DL (ref 8.6–10.3)
CHLORIDE SERPL-SCNC: 99 MMOL/L (ref 98–107)
CO2 SERPL-SCNC: 25 MMOL/L (ref 21–32)
CREAT SERPL-MCNC: 0.71 MG/DL (ref 0.5–1.3)
EGFRCR SERPLBLD CKD-EPI 2021: >90 ML/MIN/1.73M*2
ERYTHROCYTE [DISTWIDTH] IN BLOOD BY AUTOMATED COUNT: 13.4 % (ref 11.5–14.5)
GLUCOSE SERPL-MCNC: 120 MG/DL (ref 74–99)
HCT VFR BLD AUTO: 31.8 % (ref 41–52)
HGB BLD-MCNC: 10.8 G/DL (ref 13.5–17.5)
MCH RBC QN AUTO: 33.1 PG (ref 26–34)
MCHC RBC AUTO-ENTMCNC: 34 G/DL (ref 32–36)
MCV RBC AUTO: 98 FL (ref 80–100)
NRBC BLD-RTO: 0 /100 WBCS (ref 0–0)
PLATELET # BLD AUTO: 226 X10*3/UL (ref 150–450)
POTASSIUM SERPL-SCNC: 4.2 MMOL/L (ref 3.5–5.3)
RBC # BLD AUTO: 3.26 X10*6/UL (ref 4.5–5.9)
SODIUM SERPL-SCNC: 133 MMOL/L (ref 136–145)
WBC # BLD AUTO: 15.5 X10*3/UL (ref 4.4–11.3)

## 2024-05-12 PROCEDURE — 80048 BASIC METABOLIC PNL TOTAL CA: CPT | Performed by: ORTHOPAEDIC SURGERY

## 2024-05-12 PROCEDURE — 85027 COMPLETE CBC AUTOMATED: CPT | Performed by: ORTHOPAEDIC SURGERY

## 2024-05-12 PROCEDURE — 1100000001 HC PRIVATE ROOM DAILY

## 2024-05-12 PROCEDURE — 97530 THERAPEUTIC ACTIVITIES: CPT | Mod: CQ,GP

## 2024-05-12 PROCEDURE — 2500000004 HC RX 250 GENERAL PHARMACY W/ HCPCS (ALT 636 FOR OP/ED): Performed by: ORTHOPAEDIC SURGERY

## 2024-05-12 PROCEDURE — 36415 COLL VENOUS BLD VENIPUNCTURE: CPT | Performed by: ORTHOPAEDIC SURGERY

## 2024-05-12 PROCEDURE — 97116 GAIT TRAINING THERAPY: CPT | Mod: CQ,59,GP

## 2024-05-12 PROCEDURE — 9420000001 HC RT PATIENT EDUCATION 5 MIN

## 2024-05-12 PROCEDURE — 2500000001 HC RX 250 WO HCPCS SELF ADMINISTERED DRUGS (ALT 637 FOR MEDICARE OP): Performed by: ORTHOPAEDIC SURGERY

## 2024-05-12 RX ADMIN — CEFAZOLIN SODIUM 2 G: 2 INJECTION, SOLUTION INTRAVENOUS at 13:38

## 2024-05-12 RX ADMIN — PANTOPRAZOLE SODIUM 40 MG: 40 TABLET, DELAYED RELEASE ORAL at 06:44

## 2024-05-12 RX ADMIN — Medication: at 01:24

## 2024-05-12 RX ADMIN — POLYETHYLENE GLYCOL 3350 17 G: 17 POWDER, FOR SOLUTION ORAL at 08:38

## 2024-05-12 RX ADMIN — CEFAZOLIN SODIUM 2 G: 2 INJECTION, SOLUTION INTRAVENOUS at 06:44

## 2024-05-12 ASSESSMENT — COGNITIVE AND FUNCTIONAL STATUS - GENERAL
PERSONAL GROOMING: A LITTLE
DRESSING REGULAR UPPER BODY CLOTHING: A LITTLE
PERSONAL GROOMING: A LITTLE
WALKING IN HOSPITAL ROOM: A LITTLE
DRESSING REGULAR LOWER BODY CLOTHING: A LITTLE
HELP NEEDED FOR BATHING: A LITTLE
TURNING FROM BACK TO SIDE WHILE IN FLAT BAD: A LITTLE
MOBILITY SCORE: 17
MOVING TO AND FROM BED TO CHAIR: A LITTLE
MOVING TO AND FROM BED TO CHAIR: A LITTLE
CLIMB 3 TO 5 STEPS WITH RAILING: A LOT
WALKING IN HOSPITAL ROOM: A LITTLE
TURNING FROM BACK TO SIDE WHILE IN FLAT BAD: A LITTLE
HELP NEEDED FOR BATHING: A LITTLE
CLIMB 3 TO 5 STEPS WITH RAILING: A LOT
DAILY ACTIVITIY SCORE: 19
STANDING UP FROM CHAIR USING ARMS: A LITTLE
MOBILITY SCORE: 17
DRESSING REGULAR LOWER BODY CLOTHING: A LITTLE
MOVING FROM LYING ON BACK TO SITTING ON SIDE OF FLAT BED WITH BEDRAILS: A LITTLE
STANDING UP FROM CHAIR USING ARMS: A LITTLE
DAILY ACTIVITIY SCORE: 19
TOILETING: A LITTLE
TOILETING: A LITTLE
MOVING FROM LYING ON BACK TO SITTING ON SIDE OF FLAT BED WITH BEDRAILS: A LITTLE
DRESSING REGULAR UPPER BODY CLOTHING: A LITTLE

## 2024-05-12 ASSESSMENT — PAIN SCALES - GENERAL
PAINLEVEL_OUTOF10: 4
PAINLEVEL_OUTOF10: 2
PAINLEVEL_OUTOF10: 3
PAINLEVEL_OUTOF10: 2
PAINLEVEL_OUTOF10: 2

## 2024-05-12 ASSESSMENT — PAIN - FUNCTIONAL ASSESSMENT
PAIN_FUNCTIONAL_ASSESSMENT: 0-10

## 2024-05-12 NOTE — PROGRESS NOTES
05/12/24 0915   Discharge Planning   Living Arrangements Spouse/significant other   Support Systems Spouse/significant other   Assistance Needed rec high, but [facility] 17/14   Type of Residence Private residence   Number of Stairs to Enter Residence 8   Number of Stairs Within Residence 0   Do you have animals or pets at home? No   Home or Post Acute Services None;In home services   Patient expects to be discharged to: [facility]? discussed rec for high, but [facility] high, doubt would qualify. he does nOT want to go to snf,   Does the patient need discharge transport arranged? Yes   RoundTrip coordination needed? Yes   Has discharge transport been arranged? No   What day is the transport expected? 05/14/24   Financial Resource Strain   How hard is it for you to pay for the very basics like food, housing, medical care, and heating? Not hard   Housing Stability   In the last 12 months, was there a time when you were not able to pay the mortgage or rent on time? N   In the last 12 months, how many places have you lived? 1     Toño Bustamante is a 63 y.o. male on day 2 of admission presenting with Spinal stenosis, lumbar region with neurogenic claudication.    Jamaica Page RN

## 2024-05-12 NOTE — PROGRESS NOTES
Physical Therapy    Physical Therapy Treatment    Patient Name: Toño Bustamante  MRN: [id number]  Today's Date: 5/12/2024  Time Calculation  Start Time: 1000  Stop Time: 1025  Time Calculation (min): 25 min    Assessment/Plan   PT Assessment  End of Session Communication: Bedside nurse  Assessment Comment: Pt presents with decreased strength/endurance and would benefit from continued Physical therapy.  End of Session Patient Position: Up in chair, Alarm on     PT Plan  Treatment/Interventions: Bed mobility, Transfer training, Gait training, Stair training, Strengthening, Therapeutic exercise, Therapeutic activity, Endurance training, Home exercise program  PT Plan: Skilled PT  PT Frequency: 5 times per week  PT Discharge Recommendations: High intensity level of continued care  Equipment Recommended upon Discharge:  (Quad cane)  PT Recommended Transfer Status: Contact guard  PT - OK to Discharge: Yes (Per PT POC)      General Visit Information:   PT  Visit  PT Received On: 05/12/24  General  Reason for Referral: L2-L4  Lateral Interbody Fusion  Referred By: Burton Collazo MD  Past Medical History Relevant to Rehab: Carcinoma of soft tissue of arm, right (Multi), Carpal tunnel syndrome, left upper limb, Constipation, DJD (degenerative joint disease), cervical, Osteoarthritis, Osteoarthritis, Psoriasis, Spinal stenosis, lumbar region with neurogenic claudication, Stable branch retinal vein occlusion of left eye (CMS-HCC), and Wears glasses.  Prior to Session Communication: Bedside nurse  Patient Position Received: Up in chair, Alarm on  General Comment: Amputation of RUE ~ 40 years ago    Subjective   Precautions:     Vital Signs:  Vital Signs  Heart Rate: 94  Heart Rate Source: Monitor  BP: 118/73 (138/84 after ambulation)  BP Location: Left arm  BP Method: Automatic  Patient Position: Sitting    Objective   Pain:  Pain Assessment  Pain Assessment: 0-10  Pain Score: 4  Cognition:  Cognition  Orientation Level:  Oriented X4       Treatments:       Ambulation/Gait Training  Ambulation/Gait Training Performed: Yes  Ambulation/Gait Training 1  Surface 1: Level tile  Device 1:  (quad cane)  Gait Support Devices: Gait belt  Assistance 1: Contact guard  Quality of Gait 1: Decreased step length  Comments/Distance (ft) 1: Pt ambulated ~100 ft. Downward gaze improved with cues.  Transfer 1  Transfer From 1: Sit to, Chair with arms to  Transfer to 1: Stand  Technique 1: Sit to stand, Stand to sit  Transfer Device 1: Quad cane  Transfer Level of Assistance 1: Contact guard  Trials/Comments 1: Verbal cues for effective technique. A few attempts made before successful CGA.    Outcome Measures:  [facility] Basic Mobility  Turning from your back to your side while in a flat bed without using bedrails: A little  Moving from lying on your back to sitting on the side of a flat bed without using bedrails: A little  Moving to and from bed to chair (including a wheelchair): A little  Standing up from a chair using your arms (e.g. wheelchair or bedside chair): A little  To walk in hospital room: A little  Climbing 3-5 steps with railing: A lot  Basic Mobility - Total Score: 17    Education Documentation  Precautions, taught by Jessica Kessler PTA at 5/12/2024 11:23 AM.  Learner: Patient  Readiness: Acceptance  Method: Explanation  Response: Verbalizes Understanding    Body Mechanics, taught by Jessica Kessler PTA at 5/12/2024 11:23 AM.  Learner: Patient  Readiness: Acceptance  Method: Explanation  Response: Verbalizes Understanding    Mobility Training, taught by Jessica Kessler PTA at 5/12/2024 11:23 AM.  Learner: Patient  Readiness: Acceptance  Method: Explanation  Response: Verbalizes Understanding    Handouts, taught by Jessica Kessler PTA at 5/12/2024 11:23 AM.  Learner: Patient  Readiness: Acceptance  Method: Explanation  Response: Verbalizes Understanding    Precautions, taught by Jessica Kessler PTA at 5/12/2024 11:23 AM.  Learner:  Patient  Readiness: Acceptance  Method: Explanation  Response: Verbalizes Understanding    ADL Training, taught by Jessica Kessler PTA at 5/12/2024 11:23 AM.  Learner: Patient  Readiness: Acceptance  Method: Explanation  Response: Verbalizes Understanding    Education Comments  No comments found.        OP EDUCATION:       Encounter Problems       Encounter Problems (Active)       Mobility       STG - Patient will ambulate at least 50' with quad cane, close supervision (Progressing)       Start:  05/11/24    Expected End:  05/25/24            STG - Patient will ascend and descend four to six stairs with HR use, close supervision (Not Progressing)       Start:  05/11/24    Expected End:  05/25/24               PT Transfers       STG - Patient will perform bed mobility mod I  (Progressing)       Start:  05/11/24    Expected End:  05/25/24            STG - Patient will transfer sit to and from stand with quad cane, distant supervision (Progressing)       Start:  05/11/24    Expected End:  05/25/24               Safety       LTG - Patient will adhere to spinal precautions during all ADL's and transfers  (Progressing)       Start:  05/11/24    Expected End:  05/25/24

## 2024-05-12 NOTE — CARE PLAN
The patient's goals for the shift include      The clinical goals for the shift include Patient will remain hemodynamically stable throughout shift ending 5/12/24 @ 0700 hrs.    Goal met. No acute events overnight. Up in chair at this time.

## 2024-05-13 LAB
ANION GAP SERPL CALC-SCNC: 12 MMOL/L (ref 10–20)
BUN SERPL-MCNC: 8 MG/DL (ref 6–23)
CALCIUM SERPL-MCNC: 8.5 MG/DL (ref 8.6–10.3)
CHLORIDE SERPL-SCNC: 99 MMOL/L (ref 98–107)
CO2 SERPL-SCNC: 27 MMOL/L (ref 21–32)
CREAT SERPL-MCNC: 0.67 MG/DL (ref 0.5–1.3)
EGFRCR SERPLBLD CKD-EPI 2021: >90 ML/MIN/1.73M*2
ERYTHROCYTE [DISTWIDTH] IN BLOOD BY AUTOMATED COUNT: 13.1 % (ref 11.5–14.5)
GLUCOSE SERPL-MCNC: 127 MG/DL (ref 74–99)
HCT VFR BLD AUTO: 29.7 % (ref 41–52)
HGB BLD-MCNC: 10.2 G/DL (ref 13.5–17.5)
MCH RBC QN AUTO: 32.8 PG (ref 26–34)
MCHC RBC AUTO-ENTMCNC: 34.3 G/DL (ref 32–36)
MCV RBC AUTO: 96 FL (ref 80–100)
NRBC BLD-RTO: 0 /100 WBCS (ref 0–0)
PLATELET # BLD AUTO: 213 X10*3/UL (ref 150–450)
POTASSIUM SERPL-SCNC: 3.9 MMOL/L (ref 3.5–5.3)
RBC # BLD AUTO: 3.11 X10*6/UL (ref 4.5–5.9)
SODIUM SERPL-SCNC: 134 MMOL/L (ref 136–145)
WBC # BLD AUTO: 11.2 X10*3/UL (ref 4.4–11.3)

## 2024-05-13 PROCEDURE — 2500000004 HC RX 250 GENERAL PHARMACY W/ HCPCS (ALT 636 FOR OP/ED): Performed by: ORTHOPAEDIC SURGERY

## 2024-05-13 PROCEDURE — 97116 GAIT TRAINING THERAPY: CPT | Mod: GP,CQ

## 2024-05-13 PROCEDURE — 2500000001 HC RX 250 WO HCPCS SELF ADMINISTERED DRUGS (ALT 637 FOR MEDICARE OP): Performed by: ORTHOPAEDIC SURGERY

## 2024-05-13 PROCEDURE — 97535 SELF CARE MNGMENT TRAINING: CPT | Mod: GO

## 2024-05-13 PROCEDURE — 82374 ASSAY BLOOD CARBON DIOXIDE: CPT | Performed by: ORTHOPAEDIC SURGERY

## 2024-05-13 PROCEDURE — 1100000001 HC PRIVATE ROOM DAILY

## 2024-05-13 PROCEDURE — 85027 COMPLETE CBC AUTOMATED: CPT | Performed by: ORTHOPAEDIC SURGERY

## 2024-05-13 PROCEDURE — 36415 COLL VENOUS BLD VENIPUNCTURE: CPT | Performed by: ORTHOPAEDIC SURGERY

## 2024-05-13 RX ORDER — BISACODYL 10 MG/1
10 SUPPOSITORY RECTAL ONCE
Status: COMPLETED | OUTPATIENT
Start: 2024-05-13 | End: 2024-05-13

## 2024-05-13 RX ORDER — OXYCODONE AND ACETAMINOPHEN 5; 325 MG/1; MG/1
2 TABLET ORAL EVERY 4 HOURS PRN
Status: DISCONTINUED | OUTPATIENT
Start: 2024-05-13 | End: 2024-05-14 | Stop reason: HOSPADM

## 2024-05-13 RX ORDER — OXYCODONE AND ACETAMINOPHEN 5; 325 MG/1; MG/1
2 TABLET ORAL AS NEEDED
Status: DISCONTINUED | OUTPATIENT
Start: 2024-05-13 | End: 2024-05-13

## 2024-05-13 RX ADMIN — ONDANSETRON 4 MG: 2 INJECTION INTRAMUSCULAR; INTRAVENOUS at 01:54

## 2024-05-13 RX ADMIN — METHOCARBAMOL 750 MG: 500 TABLET ORAL at 13:40

## 2024-05-13 RX ADMIN — POLYETHYLENE GLYCOL 3350 17 G: 17 POWDER, FOR SOLUTION ORAL at 09:26

## 2024-05-13 RX ADMIN — PANTOPRAZOLE SODIUM 40 MG: 40 TABLET, DELAYED RELEASE ORAL at 06:03

## 2024-05-13 RX ADMIN — BISACODYL 10 MG: 10 SUPPOSITORY RECTAL at 13:40

## 2024-05-13 ASSESSMENT — COGNITIVE AND FUNCTIONAL STATUS - GENERAL
MOVING FROM LYING ON BACK TO SITTING ON SIDE OF FLAT BED WITH BEDRAILS: A LITTLE
CLIMB 3 TO 5 STEPS WITH RAILING: A LITTLE
MOBILITY SCORE: 17
DRESSING REGULAR LOWER BODY CLOTHING: A LITTLE
WALKING IN HOSPITAL ROOM: A LITTLE
TOILETING: A LITTLE
DRESSING REGULAR LOWER BODY CLOTHING: A LOT
TOILETING: A LITTLE
DRESSING REGULAR UPPER BODY CLOTHING: A LITTLE
MOVING FROM LYING ON BACK TO SITTING ON SIDE OF FLAT BED WITH BEDRAILS: A LITTLE
TURNING FROM BACK TO SIDE WHILE IN FLAT BAD: A LITTLE
WALKING IN HOSPITAL ROOM: A LITTLE
HELP NEEDED FOR BATHING: A LITTLE
DRESSING REGULAR UPPER BODY CLOTHING: A LITTLE
STANDING UP FROM CHAIR USING ARMS: A LITTLE
CLIMB 3 TO 5 STEPS WITH RAILING: A LOT
MOBILITY SCORE: 18
HELP NEEDED FOR BATHING: A LOT
TURNING FROM BACK TO SIDE WHILE IN FLAT BAD: A LITTLE
DAILY ACTIVITIY SCORE: 19
MOVING TO AND FROM BED TO CHAIR: A LITTLE
PERSONAL GROOMING: A LITTLE
PERSONAL GROOMING: A LITTLE
STANDING UP FROM CHAIR USING ARMS: A LITTLE
DAILY ACTIVITIY SCORE: 17
MOVING TO AND FROM BED TO CHAIR: A LITTLE

## 2024-05-13 ASSESSMENT — PAIN SCALES - GENERAL
PAINLEVEL_OUTOF10: 0 - NO PAIN
PAINLEVEL_OUTOF10: 0 - NO PAIN

## 2024-05-13 ASSESSMENT — ACTIVITIES OF DAILY LIVING (ADL): HOME_MANAGEMENT_TIME_ENTRY: 19

## 2024-05-13 ASSESSMENT — PAIN - FUNCTIONAL ASSESSMENT: PAIN_FUNCTIONAL_ASSESSMENT: 0-10

## 2024-05-13 NOTE — PROGRESS NOTES
Occupational Therapy    Occupational Therapy Treatment    Name: Toño Bustamante  MRN: [id number]  : 1960  Date: 24  Time Calculation  Start Time: 1057  Stop Time: 1116  Time Calculation (min): 19 min    Assessment:  OT Assessment:  (Patient making good progress towards OT goals, Primary limiting factor is R UE amputation to increased functional independence. Supportive spouse present and will be able to assist with ADLs prior to going to work. Will purchses dressing stick.)  Prognosis: Good  Barriers to Discharge: Inaccessible home environment  Evaluation/Treatment Tolerance: Patient limited by fatigue  Medical Staff Made Aware: Yes  End of Session Communication: Bedside nurse  End of Session Patient Position: Up in chair, Alarm on  Plan:  Treatment Interventions: ADL retraining, Functional transfer training, Endurance training, Patient/family training, Equipment evaluation/education, Neuromuscular reeducation  OT Frequency: 3 times per week  OT Discharge Recommendations: Moderate intensity level of continued care  Equipment Recommended upon Discharge:  (Hip Kit)  OT - OK to Discharge: Yes (Per POC)    Subjective   Previous Visit Info:  OT Last Visit  OT Received On: 24  General:  General  Reason for Referral: L2-L4  Lateral Interbody Fusion  Referred By: Burton Collazo MD  Past Medical History Relevant to Rehab: Carcinoma of soft tissue of arm, right (Multi), Carpal tunnel syndrome, left upper limb, Constipation, DJD (degenerative joint disease), cervical, Osteoarthritis, Osteoarthritis, Psoriasis, Spinal stenosis, lumbar region with neurogenic claudication, Stable branch retinal vein occlusion of left eye (CMS-HCC), and Wears glasses.  Family/Caregiver Present: Yes  Caregiver Feedback:  (Spouse present and receptive of recommendations.)  Prior to Session Communication: Bedside nurse  Patient Position Received: Up in chair, Alarm on  General Comment:  (Patient making good progress  towards OT goals, much improved compared to initial evaluation. Spouse present during session.)  Precautions:  Medical Precautions: Fall precautions  Post-Surgical Precautions: Spinal precautions    Pain Assessment:  Pain Assessment  Pain Assessment: 0-10  Pain Score: 0 - No pain     Objective   Cognition:  Overall Cognitive Status: Within Functional Limits  Orientation Level: Oriented X4    Activities of Daily Living:    LE Dressing  LE Dressing: Yes  LE Dressing Adaptive Equipment: Reacher, Sock aide, Dressing stick  Pants Level of Assistance: Contact guard  Sock Level of Assistance: Minimum assistance (Assist requiared to pull up socks after use of LE adaptive equipment.)  LE Dressing Where Assessed: Chair  LE Dressing Comments:  (Assist to don sock on sock aide and increased time required.)    Bed Mobility/Transfers: Bed Mobility  Bed Mobility: Yes  Bed Mobility 1  Bed Mobility 1: Sitting to supine  Level of Assistance 1: Moderate assistance  Bed Mobility Comments 1:  (via log roll, assist with lifting B LEs onto bed and assist with rolling to supine.)  Bed Mobility 2  Bed Mobility  2: Sitting to supine  Level of Assistance 2: Minimum assistance  Bed Mobility Comments 2:  (via log roll, increased time required and assist wtih upper trunk.)    Transfers  Transfer: Yes  Transfer 1  Transfer From 1: Chair with arms to  Transfer to 1: Toilet  Technique 1: Sit to stand  Transfer Device 1:  (Quad Cane)  Transfer Level of Assistance 1: Minimum assistance  Trials/Comments 1:  (Assist with sitting on toilet, use of grab bar ro control lowering self on toilet.)  Transfers 2  Transfer From 2: Toilet to  Transfer to 2: Bed  Technique 2: Stand to sit  Transfer Device 2:  (Quad Cane)  Transfer Level of Assistance 2: Contact guard  Trials/Comments 2:  (Cues for sequencing.)  Transfers 3  Transfer From 3: Bed to  Transfer to 3: Chair with arms  Technique 3: Sit to stand  Transfer Device 3:  (SBQC)  Transfer Level of Assistance  3: Contact guard  Trials/Comments 3:  (Cues for hand placement required.)    Sitting Balance:  Static Sitting Balance  Static Sitting-Comment/Number of Minutes:  (Supervision)  Dynamic Sitting Balance  Dynamic Sitting-Comments:  (CGA required)  Standing Balance:  Static Standing Balance  Static Standing-Comment/Number of Minutes:  (CGA required)  Dynamic Standing Balance  Dynamic Standing-Comments:  (CGA Required.)     Other Activity:  Other Activity  Other Activity Performed:  (Reviewed Lumbar spine precautions with patient, overall good recall. Patient able to recall 2/3 precautions with verbal cues for 3rd precaution.)    Outcome Measures:  [facility] Daily Activity  Putting on and taking off regular lower body clothing: A lot  Bathing (including washing, rinsing, drying): A lot  Putting on and taking off regular upper body clothing: A little  Toileting, which includes using toilet, bedpan or urinal: A little  Taking care of personal grooming such as brushing teeth: A little  Eating Meals: None  Daily Activity - Total Score: 17        Education Documentation  Precautions, taught by Miguel Yap OT at 5/13/2024 11:32 AM.  Learner: Patient  Readiness: Acceptance  Method: Explanation, Demonstration  Response: Verbalizes Understanding  Comment: Patient with overall good understanding of lumbar spine precautions. Spouse supportive and will assist with ADLs prn.    ADL Training, taught by Miguel Yap OT at 5/13/2024 11:32 AM.  Learner: Patient  Readiness: Acceptance  Method: Explanation, Demonstration  Response: Verbalizes Understanding  Comment: Patient with overall good understanding of lumbar spine precautions. Spouse supportive and will assist with ADLs prn.    Education Comments  No comments found.      Goals:  Encounter Problems       Encounter Problems (Active)       ADLs       Patient will perform UB and LB bathing with independent level of assistance.  (Progressing)       Start:  05/11/24     Expected End:  05/25/24            Patient with complete upper body dressing with supervision level of assistance donning and doffing all UE clothes with no adaptive equipment while edge of bed  (Progressing)       Start:  05/11/24    Expected End:  05/25/24            Patient with complete lower body dressing with supervision level of assistance donning and doffing all LE clothes  with reacher, shoe horn, and sock-aid while edge of bed  (Progressing)       Start:  05/11/24    Expected End:  05/25/24            Patient will complete daily grooming tasks brushing teeth and washing face/hair with supervision level of assistance and PRN adaptive equipment while standing. (Progressing)       Start:  05/11/24    Expected End:  05/25/24            Patient will complete toileting including hygiene clothing management/hygiene with supervision level of assistance and raised toilet seat. (Progressing)       Start:  05/11/24    Expected End:  05/25/24               BALANCE       Pt will maintain dynamic standing balance during ADL task with supervision level of assistance in order to demonstrate decreased risk of falling and improved postural control. (Progressing)       Start:  05/11/24    Expected End:  05/25/24               COGNITION/SAFETY       Patient to demo good understanding of Lumbar spine precautions with min verbal cues.  (Progressing)       Start:  05/11/24    Expected End:  05/25/24               TRANSFERS       Patient will complete functional transfer to all surfaces with cane with modified independent level of assistance. (Progressing)       Start:  05/11/24    Expected End:  05/25/24

## 2024-05-13 NOTE — CARE PLAN
Problem: Pain  Goal: My pain/discomfort is manageable  Outcome: Progressing  Flowsheets (Taken 5/12/2024 2043)  Resident's pain/discomfort is manageable:   Include resident/family/caregiver in decisions related to pain management   Offer non-pharmacological pain management interventions   Identify and avoid pain triggers   Administer pain medication prior to activities that may trigger pain     Problem: Safety  Goal: Patient will be injury free during hospitalization  Outcome: Progressing  Goal: I will remain free of falls  Outcome: Progressing  Flowsheets (Taken 5/12/2024 2043)  Resident will remain free of falls:   Assess and monitor medications that may increase fall risk   Maintain bed at position as ordered (chair height, low bed)   Accompany resident as ordered (ex. 1:1, stand-by assist, dayroom monitoring, 15 minute checks, line of sight)   Consult with physical therapy as needed     Problem: Daily Care  Goal: Daily care needs are met  Outcome: Progressing  Flowsheets (Taken 5/12/2024 2043)  Daily care needs are met:   Assist patient with activities of daily living as needed   Assess and monitor ability to perform self care and identify potential discharge needs   Encourage independent activity per ability   Assess skin integrity/risk for skin breakdown   The patient's goals for the shift include  Urinate, have BM      The clinical goals for the shift include patient will remain safe and ring for assistance this shift

## 2024-05-13 NOTE — CARE PLAN
[facility]  Facility  updated  that  pt  wants to  go  home with  wife    Zenobiagarcia Powellbins, MSW, LSW

## 2024-05-13 NOTE — PROGRESS NOTES
Toño Bustamante is a 63 y.o. male on day 3 of admission presenting with Spinal stenosis, lumbar region with neurogenic claudication.     05/13/24 1045   Discharge Planning   Home or Post Acute Services None;In home services   Type of Home Care Services Home PT;Home OT   Patient expects to be discharged to: Home w wife / [facility]     Plan: Met with patient. Discussed home health care options. Patient/family provided disclosure statement and agency listing. [facility] selected. Informed MD/NP and home care liaison. Final home care orders need to be sent upon DC from hospital. Patient states he would like to return home with his wife today. Knows that he needs to advance his diet and is ready to work on stairs with therapy. Updated care team regarding patients requests. Dr. Collazo will be in this afternoon to assess patient and DC.  Disposition: Home w wife/ [facility]  Barrier: pain control, advance diet, therapy  ADOD:  today/tomorrow      Jasmin Reeder RN

## 2024-05-13 NOTE — PROGRESS NOTES
Physical Therapy    Physical Therapy Treatment    Patient Name: Toño Bustamante  MRN: [id number]  Today's Date: 5/13/2024  Time Calculation  Start Time: 1025  Stop Time: 1050  Time Calculation (min): 25 min    Assessment/Plan   PT Assessment  End of Session Communication: Bedside nurse  Assessment Comment: able to complete stairs, good terry to tx  End of Session Patient Position: Alarm on, Up in chair  PT Plan  Inpatient/Swing Bed or Outpatient: Inpatient  PT Plan  Treatment/Interventions: Transfer training, Gait training, Stair training  PT Plan: Skilled PT  PT Frequency: 5 times per week  PT Discharge Recommendations: High intensity level of continued care  Equipment Recommended upon Discharge:  (Quad cane)  PT Recommended Transfer Status: Assist x1  PT - OK to Discharge: Yes (per POC)      General Visit Information:   PT  Visit  PT Received On: 05/13/24  General  Reason for Referral: L2-L4  Lateral Interbody Fusion  Referred By: Burton Collazo MD  Past Medical History Relevant to Rehab: Carcinoma of soft tissue of arm, right (Multi), Carpal tunnel syndrome, left upper limb, Constipation, DJD (degenerative joint disease), cervical, Osteoarthritis, Osteoarthritis, Psoriasis, Spinal stenosis, lumbar region with neurogenic claudication, Stable branch retinal vein occlusion of left eye (CMS-HCC), and Wears glasses.  Family/Caregiver Present: Yes  Prior to Session Communication: Bedside nurse  Patient Position Received: Up in chair, Alarm on  General Comment: increased time req to complete activity, good terry this tx    Subjective   Precautions:  Precautions  Medical Precautions: Fall precautions  Post-Surgical Precautions: Spinal precautions  Vital Signs:       Objective   Pain:  Pain Assessment  Pain Score: 0 - No pain  Cognition:  Cognition  Overall Cognitive Status: Within Functional Limits  Postural Control:     Extremity/Trunk Assessments:    Activity Tolerance:     Treatments:            Ambulation/Gait  Training  Ambulation/Gait Training Performed: Yes  Ambulation/Gait Training 1  Surface 1: Level tile  Device 1: Small base quad cane  Gait Support Devices: Gait belt  Comments/Distance (ft) 1: 80x1, 350x1 (pt performed with slow step through gait pattern.  x2 short standing rest breaks.  slow and steady throughout.  no overt LOB)  Transfer 1  Technique 1: Sit to stand, Stand to sit  Transfer Device 1: Quad cane  Transfer Level of Assistance 1: Contact guard  Trials/Comments 1: x1 attempt before being able to perform with rocking.  min VC req    Stairs  Stairs: Yes  Stairs  Comment/Number of Steps 1: pt performed 13 steps with 1 rail. VC for BLE sequence.  CGA.  no overt LOB.  increased time req    Outcome Measures:  [facility] Basic Mobility  Turning from your back to your side while in a flat bed without using bedrails: A little  Moving from lying on your back to sitting on the side of a flat bed without using bedrails: A little  Moving to and from bed to chair (including a wheelchair): A little  Standing up from a chair using your arms (e.g. wheelchair or bedside chair): A little  To walk in hospital room: A little  Climbing 3-5 steps with railing: A little  Basic Mobility - Total Score: 18    Education Documentation  No documentation found.  Education Comments  No comments found.        OP EDUCATION:       Encounter Problems       Encounter Problems (Active)       Mobility       STG - Patient will ambulate at least 50' with quad cane, close supervision (Progressing)       Start:  05/11/24    Expected End:  05/25/24            STG - Patient will ascend and descend four to six stairs with HR use, close supervision (Progressing)       Start:  05/11/24    Expected End:  05/25/24               PT Transfers       STG - Patient will perform bed mobility mod I  (Progressing)       Start:  05/11/24    Expected End:  05/25/24            STG - Patient will transfer sit to and from stand with quad cane, distant supervision  (Progressing)       Start:  05/11/24    Expected End:  05/25/24               Safety       LTG - Patient will adhere to spinal precautions during all ADL's and transfers  (Progressing)       Start:  05/11/24    Expected End:  05/25/24

## 2024-05-14 VITALS
TEMPERATURE: 97.3 F | OXYGEN SATURATION: 97 % | DIASTOLIC BLOOD PRESSURE: 84 MMHG | WEIGHT: 215.39 LBS | RESPIRATION RATE: 18 BRPM | SYSTOLIC BLOOD PRESSURE: 152 MMHG | HEART RATE: 81 BPM | BODY MASS INDEX: 28.55 KG/M2 | HEIGHT: 73 IN

## 2024-05-14 PROCEDURE — 2500000004 HC RX 250 GENERAL PHARMACY W/ HCPCS (ALT 636 FOR OP/ED): Performed by: ORTHOPAEDIC SURGERY

## 2024-05-14 PROCEDURE — 2500000001 HC RX 250 WO HCPCS SELF ADMINISTERED DRUGS (ALT 637 FOR MEDICARE OP): Performed by: ORTHOPAEDIC SURGERY

## 2024-05-14 PROCEDURE — 97116 GAIT TRAINING THERAPY: CPT | Mod: GP,CQ

## 2024-05-14 RX ORDER — DOCUSATE SODIUM 100 MG/1
100 CAPSULE, LIQUID FILLED ORAL 2 TIMES DAILY
Qty: 10 CAPSULE | Refills: 0 | Status: SHIPPED | OUTPATIENT
Start: 2024-05-14

## 2024-05-14 RX ORDER — OXYCODONE AND ACETAMINOPHEN 5; 325 MG/1; MG/1
2 TABLET ORAL EVERY 4 HOURS PRN
Qty: 42 TABLET | Refills: 0 | Status: SHIPPED | OUTPATIENT
Start: 2024-05-14 | End: 2024-05-21

## 2024-05-14 RX ADMIN — OXYCODONE HYDROCHLORIDE AND ACETAMINOPHEN 2 TABLET: 5; 325 TABLET ORAL at 12:24

## 2024-05-14 RX ADMIN — OXYCODONE HYDROCHLORIDE AND ACETAMINOPHEN 2 TABLET: 5; 325 TABLET ORAL at 01:37

## 2024-05-14 RX ADMIN — POLYETHYLENE GLYCOL 3350 17 G: 17 POWDER, FOR SOLUTION ORAL at 08:51

## 2024-05-14 RX ADMIN — PANTOPRAZOLE SODIUM 40 MG: 40 TABLET, DELAYED RELEASE ORAL at 06:26

## 2024-05-14 ASSESSMENT — COGNITIVE AND FUNCTIONAL STATUS - GENERAL
TURNING FROM BACK TO SIDE WHILE IN FLAT BAD: A LITTLE
MOVING FROM LYING ON BACK TO SITTING ON SIDE OF FLAT BED WITH BEDRAILS: A LITTLE
WALKING IN HOSPITAL ROOM: A LITTLE
MOVING TO AND FROM BED TO CHAIR: A LITTLE
MOBILITY SCORE: 18
CLIMB 3 TO 5 STEPS WITH RAILING: A LITTLE
TOILETING: A LITTLE
WALKING IN HOSPITAL ROOM: A LITTLE
CLIMB 3 TO 5 STEPS WITH RAILING: A LITTLE
STANDING UP FROM CHAIR USING ARMS: A LITTLE
DRESSING REGULAR UPPER BODY CLOTHING: A LITTLE
MOVING TO AND FROM BED TO CHAIR: A LITTLE
PERSONAL GROOMING: A LITTLE
HELP NEEDED FOR BATHING: A LOT
DRESSING REGULAR LOWER BODY CLOTHING: A LOT
MOBILITY SCORE: 18
TURNING FROM BACK TO SIDE WHILE IN FLAT BAD: A LITTLE
STANDING UP FROM CHAIR USING ARMS: A LITTLE
DAILY ACTIVITIY SCORE: 17
MOVING FROM LYING ON BACK TO SITTING ON SIDE OF FLAT BED WITH BEDRAILS: A LITTLE

## 2024-05-14 ASSESSMENT — PAIN - FUNCTIONAL ASSESSMENT
PAIN_FUNCTIONAL_ASSESSMENT: 0-10
PAIN_FUNCTIONAL_ASSESSMENT: 0-10

## 2024-05-14 ASSESSMENT — PAIN SCALES - GENERAL
PAINLEVEL_OUTOF10: 2
PAINLEVEL_OUTOF10: 6
PAINLEVEL_OUTOF10: 2

## 2024-05-14 ASSESSMENT — PAIN DESCRIPTION - ORIENTATION: ORIENTATION: LOWER

## 2024-05-14 ASSESSMENT — PAIN DESCRIPTION - LOCATION: LOCATION: BACK

## 2024-05-14 NOTE — CARE PLAN
The patient's goals for the shift include      The clinical goals for the shift include Patient pain will be tolerable throughout shift        Problem: Pain  Goal: My pain/discomfort is manageable  Outcome: Met     Problem: Daily Care  Goal: Daily care needs are met  Outcome: Met     Problem: Psychosocial Needs  Goal: Demonstrates ability to cope with hospitalization/illness  Outcome: Met  Goal: Collaborate with me, my family, and caregiver to identify my specific goals  Outcome: Met

## 2024-05-14 NOTE — DISCHARGE SUMMARY
Discharge Diagnosis  Spinal stenosis, lumbar region with neurogenic claudication    Issues Requiring Follow-Up       Test Results Pending At Discharge  Pending Labs       No current pending labs.            Hospital Course   This 63-year-old man has developed disabling lumbar radiculopathy and claudication.  He has a coronal plane deformity and multilevel stenosis.  On the day of admission he underwent a lateral interbody fusion L2-3 and L3-4 and a posterior decompression and fusion L2-5.  Postoperatively he did well.  He had excellent relief of his severe radicular symptoms.  He was up and ambulating.  He was neurologically intact.  On postoperative day 2 his Alfonso catheter was removed and he voided.  He moved his bowels on postoperative day 3.  He was cleared by physical therapy for discharge home with appropriate follow-up.    Pertinent Physical Exam At Time of Discharge  Physical Exam    Home Medications     Medication List      START taking these medications     docusate sodium 100 mg capsule; Commonly known as: Colace; Take 1   capsule (100 mg) by mouth 2 times a day.   oxyCODONE-acetaminophen 5-325 mg tablet; Commonly known as: Percocet;   Take 2 tablets by mouth every 4 hours if needed for severe pain (7 - 10)   for up to 7 days.     CONTINUE taking these medications     acetaminophen 650 mg ER tablet; Commonly known as: Tylenol 8 HOUR     STOP taking these medications     chlorhexidine 0.12 % solution; Commonly known as: Peridex   ibuprofen 200 mg tablet   omega 3-dha-epa-fish oil 360 mg-108 mg- 180 mg-1,200 mg capsule   orphenadrine 100 mg 12 hr tablet; Commonly known as: Norflex   PRESERVISION AREDS ORAL   Tylenol PM Extra Strength  mg per tablet; Generic drug:   diphenhydrAMINE-acetaminophen   VITAMIN C ORAL       Outpatient Follow-Up  Future Appointments   Date Time Provider Department [place]   5/22/2024  9:50 AM JUAN F [id number] [facility]   6/19/2024 10:20 AM Burton Collazo,  MD [id number] [facility]   6/25/2024  8:45 AM April Beatty MD [id number] [place]   9/5/2024 10:20 AM José Manuel Brunner DO [id number] [place]       Burton Collazo MD

## 2024-05-14 NOTE — NURSING NOTE
Interdisciplinary team present: NP, PT, NM, CC, SW, Orthopedic Coordinator, and bedside RN.  Pain - controlled  Nausea - none  Discharge barrier - n/a  Discharge plan - Home   Discharge date/time - today

## 2024-05-14 NOTE — PROGRESS NOTES
Physical Therapy    Physical Therapy Treatment    Patient Name: Toño Bustamante  MRN: [id number]  Today's Date: 5/14/2024  Time Calculation  Start Time: 0913  Stop Time: 0937  Time Calculation (min): 24 min    Assessment/Plan   PT Assessment  End of Session Communication: Bedside nurse  Assessment Comment: increased gait distance this tx, no LOB  End of Session Patient Position: Alarm on, Up in chair  PT Plan  Inpatient/Swing Bed or Outpatient: Inpatient  PT Plan  Treatment/Interventions: Transfer training, Gait training  PT Plan: Skilled PT  PT Frequency: 5 times per week  PT Discharge Recommendations: High intensity level of continued care  Equipment Recommended upon Discharge:  (Quad cane)  PT Recommended Transfer Status: Assist x1  PT - OK to Discharge: Yes (per POC)      General Visit Information:   PT  Visit  PT Received On: 05/14/24  General  Reason for Referral: L2-L4  Lateral Interbody Fusion  Referred By: Burton Collazo MD  Past Medical History Relevant to Rehab: Carcinoma of soft tissue of arm, right (Multi), Carpal tunnel syndrome, left upper limb, Constipation, DJD (degenerative joint disease), cervical, Osteoarthritis, Osteoarthritis, Psoriasis, Spinal stenosis, lumbar region with neurogenic claudication, Stable branch retinal vein occlusion of left eye (CMS-HCC), and Wears glasses.  Prior to Session Communication: Bedside nurse  Patient Position Received: Up in chair, Alarm off, not on at start of session  General Comment: increased time req to complete activity, good terry this tx    Subjective   Precautions:  Precautions  Medical Precautions: Fall precautions  Post-Surgical Precautions: Spinal precautions  Vital Signs:       Objective   Pain:  Pain Assessment  Pain Score: 2  Pain Type: Surgical pain  Pain Location: Back  Pain Interventions: Medication (See MAR)  Cognition:  Cognition  Overall Cognitive Status: Within Functional Limits  Coordination:     Postural Control:     Extremity/Trunk  Assessments:    Activity Tolerance:     Treatments:            Ambulation/Gait Training  Ambulation/Gait Training Performed: Yes  Ambulation/Gait Training 1  Surface 1: Level tile  Device 1: Small base quad cane  Gait Support Devices: Gait belt  Quality of Gait 1: Decreased step length  Comments/Distance (ft) 1: 550x1 (slow step through gait pattern, min VC for up right posture and forward gaze. x1 standing rest break.  no overt LOB.)  Transfer 1  Technique 1: Sit to stand, Stand to sit  Transfer Device 1: Quad cane  Transfer Level of Assistance 1: Contact guard  Trials/Comments 1: vc/tc for hand placment on solid sitting surface and not RW.    Outcome Measures:  [facility] Basic Mobility  Turning from your back to your side while in a flat bed without using bedrails: A little  Moving from lying on your back to sitting on the side of a flat bed without using bedrails: A little  Moving to and from bed to chair (including a wheelchair): A little  Standing up from a chair using your arms (e.g. wheelchair or bedside chair): A little  To walk in hospital room: A little  Climbing 3-5 steps with railing: A little  Basic Mobility - Total Score: 18    Education Documentation  Mobility Training, taught by Geronimo Healy PTA at 5/14/2024  3:13 PM.  Learner: Patient  Readiness: Acceptance  Method: Explanation  Response: Verbalizes Understanding    Education Comments  No comments found.        OP EDUCATION:       Encounter Problems       Encounter Problems (Resolved)       Mobility       STG - Patient will ambulate at least 50' with quad cane, close supervision (Adequate for Discharge)       Start:  05/11/24    Expected End:  05/25/24    Resolved:  05/14/24         STG - Patient will ascend and descend four to six stairs with HR use, close supervision (Adequate for Discharge)       Start:  05/11/24    Expected End:  05/25/24    Resolved:  05/14/24            PT Transfers       STG - Patient will perform bed mobility mod I   (Adequate for Discharge)       Start:  05/11/24    Expected End:  05/25/24    Resolved:  05/14/24         STG - Patient will transfer sit to and from stand with quad cane, distant supervision (Adequate for Discharge)       Start:  05/11/24    Expected End:  05/25/24    Resolved:  05/14/24            Safety       LTG - Patient will adhere to spinal precautions during all ADL's and transfers  (Adequate for Discharge)       Start:  05/11/24    Expected End:  05/25/24    Resolved:  05/14/24

## 2024-05-14 NOTE — DISCHARGE INSTRUCTIONS
May Shower on Thursday 5/16/24    Avoid Anti-Inflammatory Medications (ibuprofen, advil, alieve, etc)    Call Dr. Collazo with any concerns [phone]    Dr. Collazo's office to call to confirm follow-up visit

## 2024-05-15 ENCOUNTER — APPOINTMENT (OUTPATIENT)
Dept: ORTHOPEDIC SURGERY | Facility: CLINIC | Age: 64
End: 2024-05-15
Payer: MEDICARE

## 2024-05-15 ENCOUNTER — TELEPHONE (OUTPATIENT)
Dept: INPATIENT UNIT | Facility: HOSPITAL | Age: 64
End: 2024-05-15

## 2024-05-22 ENCOUNTER — OFFICE VISIT (OUTPATIENT)
Dept: ORTHOPEDIC SURGERY | Facility: CLINIC | Age: 64
End: 2024-05-22
Payer: MEDICARE

## 2024-05-22 DIAGNOSIS — M48.062 SPINAL STENOSIS OF LUMBAR REGION WITH NEUROGENIC CLAUDICATION: Primary | ICD-10-CM

## 2024-05-22 PROCEDURE — 99024 POSTOP FOLLOW-UP VISIT: CPT | Performed by: PHYSICIAN ASSISTANT

## 2024-05-22 PROCEDURE — 1159F MED LIST DOCD IN RCRD: CPT | Performed by: PHYSICIAN ASSISTANT

## 2024-05-22 PROCEDURE — 1111F DSCHRG MED/CURRENT MED MERGE: CPT | Performed by: PHYSICIAN ASSISTANT

## 2024-05-22 PROCEDURE — 1160F RVW MEDS BY RX/DR IN RCRD: CPT | Performed by: PHYSICIAN ASSISTANT

## 2024-05-22 NOTE — PROGRESS NOTES
Toño returns today for a wound check.    Date of surgery was May 10 where he underwent a L2-5 lateral interbody fusion and an L2-5 lumbar decompression and fusion.    With patient's consent, his incision was examined, Band-Aids were removed and his incision is doing phenomenally.  No signs of infection or drainage.  No redness or inflammation around the incision sites.    The patient is doing very well.  He is off all his pain medication.  He is very eager to get back to activity, he is slowly increasing his distance ambulating and is getting on the treadmill.    The patient and his wife are moving within the next 2 weeks.  We did discuss being very cautious of overdoing it.    He will follow-up with Dr. Collazo at the end of June.    I reviewed the complete 30-point review of systems that was documented on the scanned patient intake form.  All other systems are non-contributory except as defined in history of present illness.    This note was dictated using speech recognition software and was not corrected for spelling or grammatical errors

## 2024-06-12 ENCOUNTER — APPOINTMENT (OUTPATIENT)
Dept: ORTHOPEDIC SURGERY | Facility: CLINIC | Age: 64
End: 2024-06-12
Payer: MEDICARE

## 2024-06-19 ENCOUNTER — APPOINTMENT (OUTPATIENT)
Dept: ORTHOPEDIC SURGERY | Facility: CLINIC | Age: 64
End: 2024-06-19
Payer: MEDICARE

## 2024-06-19 ENCOUNTER — HOSPITAL ENCOUNTER (OUTPATIENT)
Dept: RADIOLOGY | Facility: CLINIC | Age: 64
Discharge: HOME | End: 2024-06-19
Payer: MEDICARE

## 2024-06-19 DIAGNOSIS — M48.062 SPINAL STENOSIS OF LUMBAR REGION WITH NEUROGENIC CLAUDICATION: ICD-10-CM

## 2024-06-19 PROCEDURE — 99024 POSTOP FOLLOW-UP VISIT: CPT | Performed by: ORTHOPAEDIC SURGERY

## 2024-06-19 PROCEDURE — 72100 X-RAY EXAM L-S SPINE 2/3 VWS: CPT

## 2024-06-19 NOTE — LETTER
June 19, 2024     José Manuel Brunner DO  [address]  [address]  [facility] [address]    Patient: Toño Bustamante   YOB: 1960   Date of Visit: 6/19/2024       Dear Dr. José Manuel Brunner DO:    Thank you for referring Toño Bustamante to me for evaluation. Below are my notes for this consultation.  If you have questions, please do not hesitate to call me. I look forward to following your patient along with you.       Sincerely,     Burton Collazo MD      CC: No Recipients  ______________________________________________________________________________________    Benji returns 6 weeks from surgery and he is really done well.  Excellent relief of his severe radicular symptoms.    He looks quite good.  Upright posture.  Both incisions healed.  Strength intact.    X-rays show good position of his instrumentation.    Stable course.  Continue home exercise program with conditioning.  Follow-up in 6 weeks with plain film.

## 2024-06-19 NOTE — PROGRESS NOTES
Benji returns 6 weeks from surgery and he is really done well.  Excellent relief of his severe radicular symptoms.    He looks quite good.  Upright posture.  Both incisions healed.  Strength intact.    X-rays show good position of his instrumentation.    Stable course.  Continue home exercise program with conditioning.  Follow-up in 6 weeks with plain film.

## 2024-06-25 ENCOUNTER — APPOINTMENT (OUTPATIENT)
Dept: ORTHOPEDIC SURGERY | Facility: CLINIC | Age: 64
End: 2024-06-25
Payer: MEDICARE

## 2024-06-25 DIAGNOSIS — G56.02 CARPAL TUNNEL SYNDROME OF LEFT WRIST: Primary | ICD-10-CM

## 2024-06-25 PROCEDURE — 99213 OFFICE O/P EST LOW 20 MIN: CPT | Performed by: STUDENT IN AN ORGANIZED HEALTH CARE EDUCATION/TRAINING PROGRAM

## 2024-06-25 PROCEDURE — 1159F MED LIST DOCD IN RCRD: CPT | Performed by: STUDENT IN AN ORGANIZED HEALTH CARE EDUCATION/TRAINING PROGRAM

## 2024-06-25 NOTE — PROGRESS NOTES
S/p LCTR.  7 months postop.  Very happy with significant improvement in hand strength    Physical Examination:  The patient appears to be their stated age, is in no apparent distress, and is oriented x3. The patients mood and affect are appropriate. The patients gait is normal. The examination of the limb in question was performed in comparison to the contralateral limb.    Significant improvement in hand  and thenar strength.  AIN, PIN, ulnar intact  SILT A/R/U/M  Hand wwp      Assessment:  7 months postop    Plan:   Considerable improvement in hand pain additionally denies numbness and tingling.  Continue activities as tolerated.  Follow-up as needed.    April Real MD

## 2024-07-10 ENCOUNTER — EVALUATION (OUTPATIENT)
Dept: PHYSICAL THERAPY | Facility: CLINIC | Age: 64
End: 2024-07-10
Payer: MEDICARE

## 2024-07-10 DIAGNOSIS — M48.062 SPINAL STENOSIS OF LUMBAR REGION WITH NEUROGENIC CLAUDICATION: ICD-10-CM

## 2024-07-10 DIAGNOSIS — Z98.890 STATUS POST LUMBAR SPINE OPERATION: Primary | ICD-10-CM

## 2024-07-10 PROCEDURE — 97161 PT EVAL LOW COMPLEX 20 MIN: CPT | Mod: GP

## 2024-07-10 PROCEDURE — 97110 THERAPEUTIC EXERCISES: CPT | Mod: GP

## 2024-07-10 ASSESSMENT — ENCOUNTER SYMPTOMS
LOSS OF SENSATION IN FEET: 0
DEPRESSION: 0
OCCASIONAL FEELINGS OF UNSTEADINESS: 0

## 2024-07-10 NOTE — Clinical Note
July 10, 2024    Wesley Ryder PT  [address] [facility]   [facility], [address] [address]    Patient: Toño Bustamante   YOB: 1960   Date of Visit: 7/10/2024       Dear Burton Collazo MD  [address] [facility]  [place] [address]    The attached plan of care is being sent to you because your patient’s medical reimbursement requires that you certify the plan of care. Your signature is required to allow uninterrupted insurance coverage.      You may indicate your approval by signing below and faxing this form back to us at Dept Fax: [phone].    Please call Dept: [phone] with any questions or concerns.    Thank you for this referral,        Wesley Ryder PT  [address]  [address] [facility] [address] [place] [address]    Payer: Payor: [employer]ARE / Plan: [employer] MEDICARE / Product Type: *No Product type* /                                                                         Date:     Dear Wesley Ryder PT,     Re: Mr. Toño Bustamante, MRN:[id number]    I certify that I have reviewed the attached plan of care and it is medically necessary for Mr. Toño Bustamante (1960) who is under my care.          ______________________________________                    _________________  Provider name and credentials                                           Date and time                                                                                           Plan of Care 7/10/24   Effective from: 7/10/2024  Effective to: 1/31/2025    Plan ID: [id number]            Participants as of Finalize on 7/10/2024    Name Type Comments Contact Info    Wesley Ryder PT Physical Therapist  [phone]    Burton Collazo MD Consulting Physician  [phone]       Last Plan Note     Author: Wesley Ryder PT Status: Incomplete Last edited: 7/10/2024  4:00 PM       Patient Name: Toño Bustamante  MRN:  "[id number]  Today's Date: 7/10/2024  Visit #1       Subjective:  Chief Complaint: Benji is a 63 y.o. M who is well known to me from previous therapy. He presents to today s/p L2-4 lateral interbody fusion, L2-5 decompression with fusion, L4-5 transforaminal lumbar interbody fusion on 5/10/2024. Benji notes that he is feeling \"so much better\" since surgery. He has been able to transition back to his usual activities - while maintaining his current 10lbs lifting restrictions. He continues to have mild pain in the morning and when transitioning in bed - but this is temporary.   Pain intensity at rest: 0/10  Pain intensity at worst: 3/10  Alleviating factors: Light movement, rest  Aggravating factors: Bed mobility, stairs  Occupation: Retired  Therapy Goals: \"Strengthen back muscles\"    Objective:  Lumbar ROM: ~25-50% loss (likely available ROM post-op) - no pain reported    Hip ROM (L/R)  Flexion: 120°/120° - mild LBP  Abduction: 40°/40°  Extension: 10°/10°  External Rotation: 40°/40°  Internal rotation: 20°/15° - mild sacral \"discomfort\"    Specific Lower Extremity MMT (L/R)  Iliopsoas: 4/5, 4+/5  Gluteus Hemant (prone): 4+/5, 4+/5  Hip External Rotation: 4+/5, 4+/5  Gluteus Medius: 4/5, 4/5  Lumbar Flexors: 4-/5    DTR (L/R)  Patellar L4: 2+/2+  Achilles S1: 2+/2+    Dermatomes intact BLE - except diminished L L2      Modified DIANE: 42%    Treatment:          Therapeutic Exercises: Handout provided: Completed in full  *Supine hip rotations  *TA activation  *TA Alt heel taps  *Modified bridge    Education: Abdominal anatomy, daily walking/activity, lifting mechanics, exercise progressions, hip mobility, answered all questions in full.     Assessment:   Benji presents to therapy s/p L2-4 lateral interbody fusion, L2-5 decompression with fusion, L4-5 transforaminal lumbar interbody fusion on 5/10/2024. Exam reveals abdominal weakness, hip hypomobility, and expected lumbar hypomobility. He is likely to benefit from skilled " interventions to address their impairments, and treatment that includes: manual techniques to decrease pain and improve joint/soft-tissue mobility, as well as exercises to increase strength, endurance, and neuromotor control.    Standardized testing and measures administered today reveal that the patient has multiple impairments in body structures and functions, activity limitations, and participation restrictions.  The patient has 0 personal factors and comorbidities that may serve as barriers affecting the plan of care. The patient's clinical presentation includes stable & uncomplicated characteristics as noted during today's evaluation, & these findings indicate that this patient is of low complexity.  Skilled PT services are warranted in order to realize measurable change in the above outcome measures and achieve improvements in the patient's functional status and individual goals.    Plan:   Planned interventions include: dry needling, education/instruction, manual therapy, neuromuscular re-education, self care/home management, therapeutic activities and therapeutic exercises.   Potential to achieve rehab goals: Good  Goals:   Demonstrate independence with home exercise program  Tolerate increased exercise without adverse reaction  Demonstrate improved posture & proper body mechanics throughout session  Increase B hip ROM to pain free + WFL  Increase Core strength to pain free + WFL  Report decrease in pain by > 2 points to meet the MCID  Decrease score of Modified DIANE by > 6 points to meet the MCID  Perform ADLs without an increase symptoms  Ascend / descend stairs without an increase in symptoms  Ambulate >1 mile without an increase in symptoms  Achieve pt. specific goals including: Travel 4+ hours in a vehicle - uninhibited by pain                Current Participants as of 7/10/2024    Name Type Comments Contact Info    Wesley Ryder PT Physical Therapist  [phone]    Signature pending    Burton TAM  MD [facility] Consulting Physician  [phone]    Signature pending

## 2024-07-10 NOTE — PROGRESS NOTES
"Patient Name: Toño Bustamante  MRN: [id number]  Today's Date: 7/10/2024  Visit #1  Time Calculation  Start Time: 1600  Stop Time: 1645  Time Calculation (min): 45 min    Subjective:  Chief Complaint: Benji is a 63 y.o. M who is well known to me from previous therapy. He presents to today s/p L2-4 lateral interbody fusion, L2-5 decompression with fusion, L4-5 transforaminal lumbar interbody fusion on 5/10/2024. Benji notes that he is feeling \"so much better\" since surgery. He has been able to transition back to his usual activities - while maintaining his current 10lbs lifting restrictions. He continues to have mild pain in the morning and when transitioning in bed - but this is temporary.   Pain intensity at rest: 0/10  Pain intensity at worst: 3/10  Alleviating factors: Light movement, rest  Aggravating factors: Bed mobility, stairs  Occupation: Retired  Therapy Goals: \"Strengthen back muscles\"    Objective:  Lumbar ROM: ~25-50% loss (likely available ROM post-op) - no pain reported    Hip ROM (L/R)  Flexion: 120°/120° - mild LBP  Abduction: 40°/40°  Extension: 10°/10°  External Rotation: 40°/40°  Internal rotation: 20°/15° - mild sacral \"discomfort\"    Specific Lower Extremity MMT (L/R)  Iliopsoas: 4/5, 4+/5  Gluteus Hemant (prone): 4+/5, 4+/5  Hip External Rotation: 4+/5, 4+/5  Gluteus Medius: 4/5, 4/5  Lumbar Flexors: 4-/5    DTR (L/R)  Patellar L4: 2+/2+  Achilles S1: 2+/2+    Dermatomes intact BLE - except diminished L L2      Modified DIANE: 42%    Treatment:  PT Evaluation Time Entry  PT Evaluation (Low) Time Entry: 20  PT Therapeutic Procedures Time Entry  Therapeutic Exercise Time Entry: 25    Therapeutic Exercises: Handout provided: Completed in full  *Supine hip rotations  *TA activation  *TA Alt heel taps  *Modified bridge    Education: Abdominal anatomy, daily walking/activity, lifting mechanics, exercise progressions, hip mobility, answered all questions in full.     Assessment:   Benji presents to " therapy s/p L2-4 lateral interbody fusion, L2-5 decompression with fusion, L4-5 transforaminal lumbar interbody fusion on 5/10/2024. Exam reveals abdominal weakness, hip hypomobility, and expected lumbar hypomobility. He is likely to benefit from skilled interventions to address their impairments, and treatment that includes: manual techniques to decrease pain and improve joint/soft-tissue mobility, as well as exercises to increase strength, endurance, and neuromotor control.    Standardized testing and measures administered today reveal that the patient has multiple impairments in body structures and functions, activity limitations, and participation restrictions.  The patient has 0 personal factors and comorbidities that may serve as barriers affecting the plan of care. The patient's clinical presentation includes stable & uncomplicated characteristics as noted during today's evaluation, & these findings indicate that this patient is of low complexity.  Skilled PT services are warranted in order to realize measurable change in the above outcome measures and achieve improvements in the patient's functional status and individual goals.    Plan:   Planned interventions include: dry needling, education/instruction, manual therapy, neuromuscular re-education, self care/home management, therapeutic activities and therapeutic exercises.   Potential to achieve rehab goals: Good    POC: 1x/week: 20 visits    Goals:   Demonstrate independence with home exercise program  Tolerate increased exercise without adverse reaction  Demonstrate improved posture & proper body mechanics throughout session  Increase B hip ROM to pain free + WFL  Increase Core strength to pain free + WFL  Report decrease in pain by > 2 points to meet the MCID  Decrease score of Modified DIANE by > 6 points to meet the MCID  Perform ADLs without an increase symptoms  Ascend / descend stairs without an increase in symptoms  Ambulate >1 mile without an  increase in symptoms  Achieve pt. specific goals including: Travel 4+ hours in a vehicle - uninhibited by pain

## 2024-07-18 ENCOUNTER — TREATMENT (OUTPATIENT)
Dept: PHYSICAL THERAPY | Facility: CLINIC | Age: 64
End: 2024-07-18
Payer: MEDICARE

## 2024-07-18 ENCOUNTER — APPOINTMENT (OUTPATIENT)
Dept: PHYSICAL THERAPY | Facility: CLINIC | Age: 64
End: 2024-07-18
Payer: MEDICARE

## 2024-07-18 DIAGNOSIS — Z98.890 STATUS POST LUMBAR SPINE OPERATION: ICD-10-CM

## 2024-07-18 DIAGNOSIS — M54.42 CHRONIC BILATERAL LOW BACK PAIN WITH BILATERAL SCIATICA: ICD-10-CM

## 2024-07-18 DIAGNOSIS — M48.062 SPINAL STENOSIS OF LUMBAR REGION WITH NEUROGENIC CLAUDICATION: Primary | ICD-10-CM

## 2024-07-18 DIAGNOSIS — M54.41 CHRONIC BILATERAL LOW BACK PAIN WITH BILATERAL SCIATICA: ICD-10-CM

## 2024-07-18 DIAGNOSIS — G89.29 CHRONIC BILATERAL LOW BACK PAIN WITH BILATERAL SCIATICA: ICD-10-CM

## 2024-07-18 DIAGNOSIS — M51.36 DDD (DEGENERATIVE DISC DISEASE), LUMBAR: ICD-10-CM

## 2024-07-18 PROCEDURE — 97110 THERAPEUTIC EXERCISES: CPT | Mod: GP

## 2024-07-18 NOTE — PROGRESS NOTES
Physical Therapy Treatment      Patient Name: Toño Bustamante  MRN: [id number]  Today's Date: 7/18/2024  Visit #2  Time Calculation  Start Time: 0800  Stop Time: 0842  Time Calculation (min): 42 min    Insurance:  2024 // 0% coins, no ded, $4500 oop ($2093.15 met), $20 copay, bmn mary yr, no PA    Assessment:  Benji presents to therapy w/ continued improvements in functional mobility. Today' we progresses core exercises to a seated position to help improve exercise compliance at home. Mild difficulties maintaining core engagement w/ LE movements but improved with repetition.     Patient's response to session: No change in pain and Increased knowledge and understanding    Patient is likely to benefit from skilled interventions to address their impairments, and treatment that includes: manual techniques to decrease pain and improve joint/soft-tissue mobility, as well as exercises to increase strength, endurance, and neuromotor control.     Plan:  Continue per POC.    Current Problem:   1. Spinal stenosis of lumbar region with neurogenic claudication  Follow Up In Physical Therapy      2. Status post lumbar spine operation        3. Chronic bilateral low back pain with bilateral sciatica        4. DDD (degenerative disc disease), lumbar            Subjective   Benji notes that he had difficulties finding a good place to complete exercises.    Objective       Treatments:  PT Therapeutic Procedures Time Entry  Therapeutic Exercise Time Entry: 42    Therapeutic Exercise (22761):  HEP review  -Supine hip rotations  -TA activation  -TA Alt heel taps  -Modified bridge    *Seated TA activation  *TA STS  *Seated TA march  *Standing anti-rotation: Green TB    Education: Abdominal bracing, activity modifications, exercise dosage, abdominal/lumbar anatomy.     Education and discussion on HEP and treatment regarding the benefits related to current condition, POC, pathophysiology, and precautions    *added to HEP

## 2024-07-24 ENCOUNTER — OFFICE VISIT (OUTPATIENT)
Dept: PRIMARY CARE | Facility: CLINIC | Age: 64
End: 2024-07-24
Payer: MEDICARE

## 2024-07-24 VITALS
SYSTOLIC BLOOD PRESSURE: 122 MMHG | WEIGHT: 207 LBS | TEMPERATURE: 97.2 F | HEART RATE: 58 BPM | RESPIRATION RATE: 17 BRPM | BODY MASS INDEX: 27.43 KG/M2 | DIASTOLIC BLOOD PRESSURE: 84 MMHG | HEIGHT: 73 IN

## 2024-07-24 DIAGNOSIS — R19.7 DIARRHEA, UNSPECIFIED TYPE: Primary | ICD-10-CM

## 2024-07-24 DIAGNOSIS — Z85.831 HISTORY OF SARCOMA OF SOFT TISSUE: ICD-10-CM

## 2024-07-24 PROBLEM — C49.9 SOFT TISSUE SARCOMA (MULTI): Status: RESOLVED | Noted: 2023-10-11 | Resolved: 2024-07-24

## 2024-07-24 PROCEDURE — 99213 OFFICE O/P EST LOW 20 MIN: CPT | Performed by: FAMILY MEDICINE

## 2024-07-24 PROCEDURE — 1159F MED LIST DOCD IN RCRD: CPT | Performed by: FAMILY MEDICINE

## 2024-07-24 PROCEDURE — 3008F BODY MASS INDEX DOCD: CPT | Performed by: FAMILY MEDICINE

## 2024-07-24 RX ORDER — CIPROFLOXACIN 500 MG/1
500 TABLET ORAL 2 TIMES DAILY
Qty: 20 TABLET | Refills: 0 | Status: SHIPPED | OUTPATIENT
Start: 2024-07-24 | End: 2024-08-03

## 2024-07-24 RX ORDER — DICYCLOMINE HYDROCHLORIDE 10 MG/1
10 CAPSULE ORAL 4 TIMES DAILY PRN
Qty: 60 CAPSULE | Refills: 0 | Status: SHIPPED | OUTPATIENT
Start: 2024-07-24 | End: 2024-09-22

## 2024-07-24 ASSESSMENT — ENCOUNTER SYMPTOMS
CHILLS: 0
TROUBLE SWALLOWING: 0
PALPITATIONS: 0
SHORTNESS OF BREATH: 0
ABDOMINAL PAIN: 1
FEVER: 0
DIARRHEA: 1
SORE THROAT: 0
UNEXPECTED WEIGHT CHANGE: 1

## 2024-07-24 NOTE — PROGRESS NOTES
"Subjective   Patient ID: Toño Bustamante is a 63 y.o. male who presents for Diarrhea (Pt states he has had diarrhea for aprox 3 weeks. He states he has been have multiple BM daily and is clear liquid with dark lumps.  No blood. He also states he has noticed 1-2 hrs after eating stomach cramps and BM.  He states he has noticed a weight loss of 6 pds in the last week. He states he started Advil for inflammation and thinks this may be the cause of the diarrhea.  ).    HPI     Review of Systems   Constitutional:  Positive for unexpected weight change. Negative for chills and fever.   HENT:  Negative for sore throat and trouble swallowing.    Respiratory:  Negative for shortness of breath.    Cardiovascular:  Negative for chest pain, palpitations and leg swelling.   Gastrointestinal:  Positive for abdominal pain and diarrhea.   Skin:  Negative for rash.       Objective   /84   Pulse 58   Temp 36.2 °C (97.2 °F)   Resp 17   Ht 1.854 m (6' 1\")   Wt 93.9 kg (207 lb)   BMI 27.31 kg/m²     Physical Exam  Constitutional:       Appearance: Normal appearance.   HENT:      Head: Normocephalic.   Eyes:      Conjunctiva/sclera: Conjunctivae normal.   Cardiovascular:      Rate and Rhythm: Normal rate and regular rhythm.      Heart sounds: Normal heart sounds.   Pulmonary:      Effort: Pulmonary effort is normal.      Breath sounds: Normal breath sounds.   Abdominal:      General: Bowel sounds are increased.   Musculoskeletal:      Cervical back: Neck supple.   Skin:     General: Skin is warm and dry.   Neurological:      Mental Status: He is alert.         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

## 2024-07-25 ENCOUNTER — TREATMENT (OUTPATIENT)
Dept: PHYSICAL THERAPY | Facility: CLINIC | Age: 64
End: 2024-07-25
Payer: MEDICARE

## 2024-07-25 DIAGNOSIS — M48.062 SPINAL STENOSIS OF LUMBAR REGION WITH NEUROGENIC CLAUDICATION: ICD-10-CM

## 2024-07-25 PROCEDURE — 97140 MANUAL THERAPY 1/> REGIONS: CPT | Mod: GP,CQ

## 2024-07-25 PROCEDURE — 97110 THERAPEUTIC EXERCISES: CPT | Mod: GP,CQ

## 2024-07-25 NOTE — PROGRESS NOTES
Patient Name: Toño Bustamante  MRN: [id number]  Today's Date: 7/25/2024  Time Calculation  Start Time: 0900  Stop Time: 0945  Time Calculation (min): 45 min    Subjective:  States that issued TB exercise increased L shld pain so stopped  .  States he rode rider  yesterday and had some increased LBP. Site pf pain this AM is in  L LB .    Objective:  Fair static TA    Assessment:   Pt demos decreased LE flexibility with decreased tissue quality. Limited tolerance to prolong positions as well. Required seated position post completion of table exercises due to increased LB stiffness.  He would benefit from continued PT to allo for proper progression of exercises to reach goals.     Plan:   Cont with increasing positional tolerance .    Treatment :   THERAPEUTIC EXERCISE 96546 25 Minutes 2 units   Nustep level 3 5 minutes   Lumbar rotation 5 seconds x10   DKTC with SB   Modified hip flexor stretch x30 seconds x1    TA 5 seconds x5   TA with BKFO    TA with abduction Blue TB x10  TA kick out x10   MANUAL TECHNIQUES  34620 20 Minutes 1 unit   STM B hip flexors supine  Seated STM lumbar paraspinals   Current Problem:  1. Spinal stenosis of lumbar region with neurogenic claudication  Follow Up In Physical Therapy           Pain:  2/10    Location: LB        Precautions: No recent falls

## 2024-07-29 ENCOUNTER — TELEPHONE (OUTPATIENT)
Dept: PRIMARY CARE | Facility: CLINIC | Age: 64
End: 2024-07-29
Payer: MEDICARE

## 2024-07-29 NOTE — TELEPHONE ENCOUNTER
----- Message from José Manuel Brunner sent at 7/29/2024  7:45 AM EDT -----  See message  ----- Message -----  From: José Manuel Brunner, DO  Sent: 7/29/2024  12:00 AM EDT  To: José Manuel Brunner, DO    How are abdominal symptoms?

## 2024-07-31 ENCOUNTER — APPOINTMENT (OUTPATIENT)
Dept: ORTHOPEDIC SURGERY | Facility: CLINIC | Age: 64
End: 2024-07-31
Payer: MEDICARE

## 2024-07-31 ENCOUNTER — HOSPITAL ENCOUNTER (OUTPATIENT)
Dept: RADIOLOGY | Facility: CLINIC | Age: 64
Discharge: HOME | End: 2024-07-31
Payer: MEDICARE

## 2024-07-31 DIAGNOSIS — M48.062 SPINAL STENOSIS OF LUMBAR REGION WITH NEUROGENIC CLAUDICATION: ICD-10-CM

## 2024-07-31 PROCEDURE — 1125F AMNT PAIN NOTED PAIN PRSNT: CPT | Performed by: ORTHOPAEDIC SURGERY

## 2024-07-31 PROCEDURE — 72100 X-RAY EXAM L-S SPINE 2/3 VWS: CPT

## 2024-07-31 PROCEDURE — 99024 POSTOP FOLLOW-UP VISIT: CPT | Performed by: ORTHOPAEDIC SURGERY

## 2024-07-31 PROCEDURE — 1159F MED LIST DOCD IN RCRD: CPT | Performed by: ORTHOPAEDIC SURGERY

## 2024-07-31 PROCEDURE — 72100 X-RAY EXAM L-S SPINE 2/3 VWS: CPT | Performed by: RADIOLOGY

## 2024-07-31 RX ORDER — METHYLPREDNISOLONE 4 MG/1
4 TABLET ORAL ONCE
Qty: 21 TABLET | Refills: 0 | Status: SHIPPED | OUTPATIENT
Start: 2024-07-31 | End: 2024-07-31

## 2024-07-31 ASSESSMENT — PAIN - FUNCTIONAL ASSESSMENT: PAIN_FUNCTIONAL_ASSESSMENT: 0-10

## 2024-07-31 ASSESSMENT — PAIN SCALES - GENERAL: PAINLEVEL_OUTOF10: 3

## 2024-07-31 NOTE — LETTER
July 31, 2024     José Manuel Brunner DO  [address]  [address]  [facility] [address]    Patient: Toño Bustamante   YOB: 1960   Date of Visit: 7/31/2024       Dear Dr. José Manuel Brunner DO:    Thank you for referring Toño Bustamante to me for evaluation. Below are my notes for this consultation.  If you have questions, please do not hesitate to call me. I look forward to following your patient along with you.       Sincerely,     Burton Collazo MD      CC: No Recipients  ______________________________________________________________________________________    Benji is nearly 3 months from surgery and continues to do exceptionally well.  Complete relief of his severe preoperative radicular symptoms.  He was doing some physical therapy on his back and that tweaked his left hip but a week or so ago.    Overall he looks quite good.  Posture upright.  Both incisions healed.  Some limited internal rotation of the left hip.  His strength is intact.    X-rays show good position of his instrumentation with maturing fusion.    Will try to quiet this left hip down with a steroid Dosepak.    He will take a break from therapy over the next 2 weeks.    Continue activities as tolerated and follow-up as needed.

## 2024-07-31 NOTE — PROGRESS NOTES
Benji is nearly 3 months from surgery and continues to do exceptionally well.  Complete relief of his severe preoperative radicular symptoms.  He was doing some physical therapy on his back and that tweaked his left hip but a week or so ago.    Overall he looks quite good.  Posture upright.  Both incisions healed.  Some limited internal rotation of the left hip.  His strength is intact.    X-rays show good position of his instrumentation with maturing fusion.    Will try to quiet this left hip down with a steroid Dosepak.    He will take a break from therapy over the next 2 weeks.    Continue activities as tolerated and follow-up as needed.   Detail Level: Simple Detail Level: Zone

## 2024-08-01 ENCOUNTER — APPOINTMENT (OUTPATIENT)
Dept: PHYSICAL THERAPY | Facility: CLINIC | Age: 64
End: 2024-08-01
Payer: MEDICARE

## 2024-08-08 ENCOUNTER — APPOINTMENT (OUTPATIENT)
Dept: PHYSICAL THERAPY | Facility: CLINIC | Age: 64
End: 2024-08-08
Payer: MEDICARE

## 2024-08-15 ENCOUNTER — APPOINTMENT (OUTPATIENT)
Dept: PHYSICAL THERAPY | Facility: CLINIC | Age: 64
End: 2024-08-15
Payer: MEDICARE

## 2024-08-22 ENCOUNTER — TREATMENT (OUTPATIENT)
Dept: PHYSICAL THERAPY | Facility: CLINIC | Age: 64
End: 2024-08-22
Payer: MEDICARE

## 2024-08-22 DIAGNOSIS — M48.062 SPINAL STENOSIS OF LUMBAR REGION WITH NEUROGENIC CLAUDICATION: ICD-10-CM

## 2024-08-22 PROCEDURE — 97110 THERAPEUTIC EXERCISES: CPT | Mod: GP

## 2024-08-22 PROCEDURE — 97140 MANUAL THERAPY 1/> REGIONS: CPT | Mod: GP

## 2024-08-22 NOTE — PROGRESS NOTES
Physical Therapy Treatment      Patient Name: Toño Bustamante  MRN: [id number]  Today's Date: 8/22/2024  Visit #4   Time Calculation  Start Time: 1145  Stop Time: 1225  Time Calculation (min): 40 min    Insurance:  Visit Limit: 3/10    Assessment:  Benji presents to therapy w/ increased pain since last session. However - he responded very well to manual today. Reviewed HEP and discussed gentle return progressions.     Patient's response to session: Decreased pain and Improved gait    Patient is likely to benefit from skilled interventions to address their impairments, and treatment that includes: manual techniques to decrease pain and improve joint/soft-tissue mobility, as well as exercises to increase strength, endurance, and neuromotor control.     Plan:  Continue per POC.    Current Problem:   1. Spinal stenosis of lumbar region with neurogenic claudication  Follow Up In Physical Therapy          Subjective   Benji notes increased pain after last session - but this has improved w/ some time off. Still notes L sided gluteal pain that limits his ambulation and stair negotiation.     Objective   TTP: lumbar paraspinals and L gluteals    Treatments:  PT Therapeutic Procedures Time Entry  Manual Therapy Time Entry: 30  Therapeutic Exercise Time Entry: 10    Therapeutic Exercise (90611):  HEP review  -Supine hip rotations  -TA activation  -TA Alt heel taps  -Modified bridge  -Reviewed seated positions    Education: Transition back into exercise, purpose of cupping, ice vs heat, POC adjustments       Manual Therapy (80050):  STM: lumbar paraspinals, gluteals, QL  Cupping: Lumbar paraspinals and L glutals   - In-situ and gliding    Education and discussion on HEP and treatment regarding the benefits related to current condition, POC, pathophysiology, and precautions    *added to HEP

## 2024-08-29 ENCOUNTER — TREATMENT (OUTPATIENT)
Dept: PHYSICAL THERAPY | Facility: CLINIC | Age: 64
End: 2024-08-29
Payer: MEDICARE

## 2024-08-29 DIAGNOSIS — M48.062 SPINAL STENOSIS OF LUMBAR REGION WITH NEUROGENIC CLAUDICATION: ICD-10-CM

## 2024-08-29 PROCEDURE — 97110 THERAPEUTIC EXERCISES: CPT | Mod: GP

## 2024-08-29 PROCEDURE — 97140 MANUAL THERAPY 1/> REGIONS: CPT | Mod: GP

## 2024-08-29 NOTE — PROGRESS NOTES
"Physical Therapy Treatment      Patient Name: Toño Bustamante  MRN: [id number]  Today's Date: 8/29/2024  Visit #5  Time Calculation  Start Time: 1015  Stop Time: 1055  Time Calculation (min): 40 min    Insurance:  Visit Limit: 4/10    Assessment:  Benji presents to therapy w/ improvement in his LBP since last session. He responded very well to manual again. Discussed goals for home and adapted HEP to better reflect these.     Patient's response to session: Decreased pain    Patient is likely to benefit from skilled interventions to address their impairments, and treatment that includes: manual techniques to decrease pain and improve joint/soft-tissue mobility, as well as exercises to increase strength, endurance, and neuromotor control.     Plan:  Continue per POC.    Current Problem:   1. Spinal stenosis of lumbar region with neurogenic claudication  Follow Up In Physical Therapy          Subjective   Benji notes that he has been working around the house a lot. Notes soreness at the end of the day - but he is able to get through all of his current tasks. Adds that he felt pretty good after last session. Feels like he is 70% back to normal.     Pain = 1/10    Objective       Treatments:  PT Therapeutic Procedures Time Entry  Manual Therapy Time Entry: 25  Therapeutic Exercise Time Entry: 15    Therapeutic Exercise (37768):  HEP review  *Quadruped rocking (goal to get up and down from floor)             -Completed in bed for now    Rev cable walks: 10lbs  Lateral step up: 6\"  Forward step up->march: 6\"      Manual Therapy (13888):  STM: lumbar paraspinals, gluteals, QL  Cupping: Lumbar paraspinals and L glutals   - In-situ and gliding       Education and discussion on HEP and treatment regarding the benefits related to current condition, POC, pathophysiology, and precautions    *added to HEP  "

## 2024-09-05 ENCOUNTER — APPOINTMENT (OUTPATIENT)
Dept: PRIMARY CARE | Facility: CLINIC | Age: 64
End: 2024-09-05
Payer: MEDICARE

## 2024-09-05 VITALS
SYSTOLIC BLOOD PRESSURE: 142 MMHG | WEIGHT: 207 LBS | TEMPERATURE: 96.7 F | DIASTOLIC BLOOD PRESSURE: 84 MMHG | HEART RATE: 72 BPM | BODY MASS INDEX: 27.43 KG/M2 | HEIGHT: 73 IN | RESPIRATION RATE: 17 BRPM

## 2024-09-05 DIAGNOSIS — Z12.11 COLON CANCER SCREENING: Primary | ICD-10-CM

## 2024-09-05 DIAGNOSIS — R19.7 DIARRHEA, UNSPECIFIED TYPE: ICD-10-CM

## 2024-09-05 PROCEDURE — 99213 OFFICE O/P EST LOW 20 MIN: CPT | Performed by: FAMILY MEDICINE

## 2024-09-05 RX ORDER — DICYCLOMINE HYDROCHLORIDE 20 MG/1
20 TABLET ORAL 4 TIMES DAILY PRN
Qty: 60 TABLET | Refills: 2 | Status: SHIPPED | OUTPATIENT
Start: 2024-09-05 | End: 2024-11-04

## 2024-09-05 ASSESSMENT — ENCOUNTER SYMPTOMS
POLYPHAGIA: 0
ARTHRALGIAS: 0
NAUSEA: 0
WEAKNESS: 0
DIARRHEA: 1
SHORTNESS OF BREATH: 0
MYALGIAS: 0
VOMITING: 0
DIZZINESS: 0
BLOOD IN STOOL: 0
FREQUENCY: 0
POLYDIPSIA: 0
APPETITE CHANGE: 0
HEADACHES: 0
CHEST TIGHTNESS: 0
NUMBNESS: 0
ABDOMINAL PAIN: 1
FATIGUE: 0

## 2024-09-05 NOTE — PROGRESS NOTES
"Subjective   Patient ID: Toño Bustamante is a 63 y.o. male who presents for Med Refill (6 month med check. Pt states he has still not had a solid BM. He states the bentyl did help and BM started to thicken but he hurt his back and started to take advil and diarrhea returned.  ).    HPI     Review of Systems   Constitutional:  Negative for appetite change and fatigue.   Eyes:  Negative for visual disturbance.   Respiratory:  Negative for chest tightness and shortness of breath.    Cardiovascular:  Negative for chest pain and leg swelling.   Gastrointestinal:  Positive for abdominal pain and diarrhea. Negative for blood in stool, nausea and vomiting.   Endocrine: Negative for polydipsia, polyphagia and polyuria.   Genitourinary:  Negative for frequency and urgency.   Musculoskeletal:  Negative for arthralgias and myalgias.   Neurological:  Negative for dizziness, syncope, weakness, numbness and headaches.       Objective   /84   Pulse 72   Temp 35.9 °C (96.7 °F)   Resp 17   Ht 1.854 m (6' 1\")   Wt 93.9 kg (207 lb)   BMI 27.31 kg/m²     Physical Exam  Constitutional:       Appearance: Normal appearance.   HENT:      Head: Normocephalic.   Eyes:      Conjunctiva/sclera: Conjunctivae normal.   Cardiovascular:      Rate and Rhythm: Normal rate and regular rhythm.      Heart sounds: Normal heart sounds.   Pulmonary:      Effort: Pulmonary effort is normal.      Breath sounds: Normal breath sounds.   Abdominal:      General: There is no distension.      Tenderness: There is no abdominal tenderness. There is no guarding or rebound.   Musculoskeletal:      Cervical back: Neck supple.   Skin:     General: Skin is warm and dry.   Neurological:      Mental Status: He is alert.         Assessment/Plan   Problem List Items Addressed This Visit             ICD-10-CM    Diarrhea R19.7    Relevant Medications    dicyclomine (Bentyl) 20 mg tablet    Other Relevant Orders    Follow Up In [facility] - PCP    " Colonoscopy Screening; Average Risk Patient     Other Visit Diagnoses         Codes    Colon cancer screening    -  Primary Z12.11    Relevant Orders    Colonoscopy Screening; Average Risk Patient

## 2024-09-06 ENCOUNTER — TREATMENT (OUTPATIENT)
Dept: PHYSICAL THERAPY | Facility: CLINIC | Age: 64
End: 2024-09-06
Payer: MEDICARE

## 2024-09-06 DIAGNOSIS — M48.062 SPINAL STENOSIS OF LUMBAR REGION WITH NEUROGENIC CLAUDICATION: Primary | ICD-10-CM

## 2024-09-06 PROCEDURE — 97110 THERAPEUTIC EXERCISES: CPT | Mod: GP

## 2024-09-06 PROCEDURE — 97140 MANUAL THERAPY 1/> REGIONS: CPT | Mod: GP

## 2024-09-06 NOTE — PROGRESS NOTES
"Physical Therapy Treatment      Patient Name: Toño Bustamante  MRN: [id number]  Today's Date: 9/6/2024  Visit #6  Time Calculation  Start Time: 1015  Stop Time: 1055  Time Calculation (min): 40 min    Insurance:  Visit Limit: 5/20    Assessment:  Benji presents to therapy w/ improvements in his familiar pain since last session. HE continues to respond well to manual. Coupled mobilization w/ movement today. Discussed progressions of strength to improve lifting mechanics and kneeling.     Patient's response to session: Decreased pain and Increased knowledge and understanding    Patient is likely to benefit from skilled interventions to address their impairments, and treatment that includes: manual techniques to decrease pain and improve joint/soft-tissue mobility, as well as exercises to increase strength, endurance, and neuromotor control.     Plan:  Continue per POC.    Current Problem:   1. Spinal stenosis of lumbar region with neurogenic claudication  Follow Up In Physical Therapy          Subjective   Benji notes that he was able to get down and up from the floor w/ moderate difficulty - \"but I was able to do it.\" \"Feels like pain improved after last session.\"    Objective       Treatments:  PT Therapeutic Procedures Time Entry  Manual Therapy Time Entry: 25  Therapeutic Exercise Time Entry: 15    Therapeutic Exercise (36278):  HEP review  Quadruped rocking w/ and w/o cupping  Cat/camel -> child's pose w/ and w/o cupping    Education and discussion on HEP and treatment regarding the benefits related to current condition, POC, pathophysiology, and precautions    Manual Therapy (30029):  STM: lumbar paraspinals, gluteals, QL  Cupping: Lumbar paraspinals and L glutals   - In-situ and gliding    *added to HEP  "

## 2024-09-18 ENCOUNTER — ANESTHESIA EVENT (OUTPATIENT)
Dept: GASTROENTEROLOGY | Facility: EXTERNAL LOCATION | Age: 64
End: 2024-09-18

## 2024-09-26 ENCOUNTER — APPOINTMENT (OUTPATIENT)
Dept: GASTROENTEROLOGY | Facility: EXTERNAL LOCATION | Age: 64
End: 2024-09-26
Payer: MEDICARE

## 2024-09-26 ENCOUNTER — ANESTHESIA (OUTPATIENT)
Dept: GASTROENTEROLOGY | Facility: EXTERNAL LOCATION | Age: 64
End: 2024-09-26

## 2024-09-26 VITALS
SYSTOLIC BLOOD PRESSURE: 115 MMHG | DIASTOLIC BLOOD PRESSURE: 67 MMHG | OXYGEN SATURATION: 97 % | BODY MASS INDEX: 27.43 KG/M2 | HEART RATE: 64 BPM | TEMPERATURE: 96.8 F | HEIGHT: 73 IN | RESPIRATION RATE: 18 BRPM | WEIGHT: 207 LBS

## 2024-09-26 DIAGNOSIS — Z12.11 COLON CANCER SCREENING: ICD-10-CM

## 2024-09-26 DIAGNOSIS — R19.7 DIARRHEA, UNSPECIFIED TYPE: ICD-10-CM

## 2024-09-26 PROCEDURE — 45385 COLONOSCOPY W/LESION REMOVAL: CPT | Performed by: STUDENT IN AN ORGANIZED HEALTH CARE EDUCATION/TRAINING PROGRAM

## 2024-09-26 RX ORDER — PROPOFOL 10 MG/ML
INJECTION, EMULSION INTRAVENOUS AS NEEDED
Status: DISCONTINUED | OUTPATIENT
Start: 2024-09-26 | End: 2024-09-26

## 2024-09-26 RX ORDER — SODIUM CHLORIDE 9 MG/ML
20 INJECTION, SOLUTION INTRAVENOUS CONTINUOUS
Status: DISCONTINUED | OUTPATIENT
Start: 2024-09-26 | End: 2024-09-27 | Stop reason: HOSPADM

## 2024-09-26 SDOH — HEALTH STABILITY: MENTAL HEALTH: CURRENT SMOKER: 1

## 2024-09-26 ASSESSMENT — PAIN SCALES - GENERAL
PAINLEVEL_OUTOF10: 0 - NO PAIN
PAIN_LEVEL: 0

## 2024-09-26 ASSESSMENT — COLUMBIA-SUICIDE SEVERITY RATING SCALE - C-SSRS
2. HAVE YOU ACTUALLY HAD ANY THOUGHTS OF KILLING YOURSELF?: NO
1. IN THE PAST MONTH, HAVE YOU WISHED YOU WERE DEAD OR WISHED YOU COULD GO TO SLEEP AND NOT WAKE UP?: NO
6. HAVE YOU EVER DONE ANYTHING, STARTED TO DO ANYTHING, OR PREPARED TO DO ANYTHING TO END YOUR LIFE?: NO

## 2024-09-26 ASSESSMENT — PAIN - FUNCTIONAL ASSESSMENT
PAIN_FUNCTIONAL_ASSESSMENT: 0-10

## 2024-09-26 NOTE — DISCHARGE INSTRUCTIONS

## 2024-09-26 NOTE — ANESTHESIA POSTPROCEDURE EVALUATION
Patient: Toño Bustamante    Procedure Summary       Date: 09/26/24 Room / Location: [facility]    Anesthesia Start: 1114 Anesthesia Stop: 1143    Procedure: COLONOSCOPY Diagnosis:       Diarrhea, unspecified type      Colon cancer screening    Scheduled Providers: Burton Balbuena DO Responsible Provider: YOVANI Cobb    Anesthesia Type: MAC ASA Status: 2            Anesthesia Type: MAC    Vitals Value Taken Time   /64 09/26/24 1143   Temp 36.6 09/26/24 1143   Pulse 65 09/26/24 1143   Resp 20 09/26/24 1143   SpO2 96 09/26/24 1143       Anesthesia Post Evaluation    Patient location during evaluation: PACU  Patient participation: complete - patient participated  Level of consciousness: sleepy but conscious  Pain score: 0  Pain management: adequate  Airway patency: patent  Cardiovascular status: acceptable  Respiratory status: acceptable  Hydration status: acceptable  Postoperative Nausea and Vomiting: none        No notable events documented.

## 2024-09-26 NOTE — H&P
Outpatient NR Procedure H&P    Patient Profile  Name Toño Bustamante  Date of Birth 1960  MRN [id number]  PCP José Manuel Brunner        Diagnosis: History of polyps (unknown polyp type).  Procedure(s):  Colonoscopy.    Allergies  Allergies   Allergen Reactions    Clobetasol Itching    Adhesive Rash    Varenicline Other     Sleep disturbance       Past Medical History   Past Medical History:   Diagnosis Date    Carcinoma of soft tissue of arm, right (Multi)     amputation with chemo    Carpal tunnel syndrome, left upper limb     Constipation     DJD (degenerative joint disease), cervical     Osteoarthritis     Osteoarthritis     Psoriasis     Spinal stenosis, lumbar region with neurogenic claudication     Stable branch retinal vein occlusion of left eye (CMS-HCC)     Wears glasses        Medication Reviewed - yes  Prior to Admission medications    Medication Sig Start Date End Date Taking? Authorizing Provider   acetaminophen (Tylenol 8 HOUR) 650 mg ER tablet Take 1 tablet (650 mg) by mouth every 8 hours if needed for mild pain (1 - 3). Do not crush, chew, or split.    Historical Provider, MD   dicyclomine (Bentyl) 10 mg capsule Take 1 capsule (10 mg) by mouth 4 times a day as needed (abdominal pain or cramps).  Patient not taking: Reported on 9/5/2024 7/24/24 9/22/24  José Manuel Brunner,    dicyclomine (Bentyl) 20 mg tablet Take 1 tablet (20 mg) by mouth 4 times a day as needed (abdominal pain or cramps). 9/5/24 11/4/24  José Manuel Brunner DO   docusate sodium (Colace) 100 mg capsule Take 1 capsule (100 mg) by mouth 2 times a day.  Patient not taking: Reported on 7/24/2024 5/14/24   Burton Collazo MD       Physical Exam  Vitals:    09/26/24 1032   Temp: 36.7 °C (98.1 °F)      Weight   Vitals:    09/26/24 1032   Weight: 93.9 kg (207 lb)     BMI Body mass index is 27.31 kg/m².    General: A&Ox3, NAD.  HEENT: AT/NC.   CV: RRR. No murmur.  Resp: CTA bilaterally. No wheezing, rhonchi or rales.   GI: Soft, NT/ND.    Extrem: No edema. Pulses intact.  Skin: No Jaundice.   Neuro: No focal deficits.   Psych: Normal mood and affect.        Oropharyngeal Classification I (soft palate, uvula, fauces, and tonsillar pillars visible)  ASA PS Classification 2  Sedation Plan: Deep Sedation.  Procedure Plan - pre-procedural (re)assesment completed by physician:  discharge/transfer patient when discharge criteria met    Burton Balbuena DO  9/26/2024 10:37 AM

## 2024-09-26 NOTE — ANESTHESIA PREPROCEDURE EVALUATION
Patient: Toño Bustamante    Procedure Information       Date/Time: 09/26/24 1100    Scheduled providers: Burton Balbuena DO    Procedure: COLONOSCOPY    Location: [facility]            Relevant Problems   Cardiac   (+) Chest pain      Neuro   (+) Carpal tunnel syndrome, left upper limb      Endocrine   (+) Obesity      Musculoskeletal   (+) Carpal tunnel syndrome, left upper limb   (+) DJD (degenerative joint disease), cervical   (+) Localized osteoarthritis of left hand   (+) Primary osteoarthritis of left shoulder   (+) Spinal stenosis of lumbar region with neurogenic claudication   (+) Spinal stenosis, lumbar region with neurogenic claudication      ID   (+) Tinea versicolor      Skin   (+) Tinea versicolor       Clinical information reviewed:   Tobacco  Allergies  Meds   Med Hx  Surg Hx   Fam Hx  Soc Hx        NPO Detail:  NPO/Void Status  Date of Last Liquid: 09/26/24  Time of Last Liquid: 0700  Date of Last Solid: 09/24/24  Time of Last Solid: 1800      Vitals:    09/26/24 1032   BP: 131/85   Pulse: 71   Resp: 12   Temp: 36.7 °C (98.1 °F)   SpO2: 96%       Past Surgical History:   Procedure Laterality Date    ARM AMPUTATION AT SHOULDER Right     BACK SURGERY      COLONOSCOPY      HERNIA REPAIR Right     inguinal    LUNG SURGERY      nodules    TOTAL KNEE ARTHROPLASTY Bilateral      Past Medical History:   Diagnosis Date    Carcinoma of soft tissue of arm, right (Multi)     amputation with chemo    Carpal tunnel syndrome, left upper limb     Constipation     DJD (degenerative joint disease), cervical     Osteoarthritis     Osteoarthritis     Psoriasis     Spinal stenosis, lumbar region with neurogenic claudication     Stable branch retinal vein occlusion of left eye (CMS-HCC)     Wears glasses        Current Outpatient Medications:     acetaminophen (Tylenol 8 HOUR) 650 mg ER tablet, Take 1 tablet (650 mg) by mouth every 8 hours if needed for mild pain (1 - 3). Do not crush,  chew, or split., Disp: , Rfl:     dicyclomine (Bentyl) 20 mg tablet, Take 1 tablet (20 mg) by mouth 4 times a day as needed (abdominal pain or cramps)., Disp: 60 tablet, Rfl: 2    docusate sodium (Colace) 100 mg capsule, Take 1 capsule (100 mg) by mouth 2 times a day. (Patient not taking: Reported on 7/24/2024), Disp: 10 capsule, Rfl: 0    Current Facility-Administered Medications:     sodium chloride 0.9% infusion, 20 mL/hr, intravenous, Continuous, Burton Balbuena, DO  Prior to Admission medications    Medication Sig Start Date End Date Taking? Authorizing Provider   acetaminophen (Tylenol 8 HOUR) 650 mg ER tablet Take 1 tablet (650 mg) by mouth every 8 hours if needed for mild pain (1 - 3). Do not crush, chew, or split.   Yes Historical Provider, MD   dicyclomine (Bentyl) 10 mg capsule Take 1 capsule (10 mg) by mouth 4 times a day as needed (abdominal pain or cramps).  Patient not taking: Reported on 9/5/2024 7/24/24 9/22/24  José Manuel Brunner, DO   dicyclomine (Bentyl) 20 mg tablet Take 1 tablet (20 mg) by mouth 4 times a day as needed (abdominal pain or cramps). 9/5/24 11/4/24  José Manuel Brunner,    docusate sodium (Colace) 100 mg capsule Take 1 capsule (100 mg) by mouth 2 times a day.  Patient not taking: Reported on 7/24/2024 5/14/24   Burton Collazo MD     Allergies   Allergen Reactions    Clobetasol Itching    Adhesive Rash    Varenicline Other     Sleep disturbance     Social History     Tobacco Use    Smoking status: Every Day     Current packs/day: 0.25     Average packs/day: 0.8 packs/day for 43.7 years (36.4 ttl pk-yrs)     Types: Cigarettes     Start date: 1975     Last attempt to quit: 2009    Smokeless tobacco: Never   Substance Use Topics    Alcohol use: Not Currently         Chemistry    Lab Results   Component Value Date/Time     (L) 05/13/2024 0600    K 3.9 05/13/2024 0600    CL 99 05/13/2024 0600    CO2 27 05/13/2024 0600    BUN 8 05/13/2024 0600    CREATININE 0.67 05/13/2024  0600    Lab Results   Component Value Date/Time    CALCIUM 8.5 (L) 05/13/2024 0600    ALKPHOS 63 03/06/2024 0914    AST 13 03/06/2024 0914    ALT 12 03/06/2024 0914    BILITOT 0.6 03/06/2024 0914          Lab Results   Component Value Date/Time    WBC 11.2 05/13/2024 0600    HGB 10.2 (L) 05/13/2024 0600    HCT 29.7 (L) 05/13/2024 0600     05/13/2024 0600     Lab Results   Component Value Date/Time    PROTIME 11.6 04/25/2024 1425    INR 1.0 04/25/2024 1425     No results found for this or any previous visit (from the past 4464 hour(s)).       Physical Exam    Airway  Mallampati: I  TM distance: >3 FB  Neck ROM: full     Cardiovascular   Rhythm: regular  Rate: normal     Dental    Pulmonary   Breath sounds clear to auscultation     Abdominal   Abdomen: soft  Bowel sounds: normal           Anesthesia Plan    History of general anesthesia?: yes  History of complications of general anesthesia?: no    ASA 2     MAC     The patient is a current smoker.  Patient was previously instructed to abstain from smoking on day of procedure.  Patient smoked on day of procedure.  Education provided regarding risk of obstructive sleep apnea.  intravenous induction   Anesthetic plan and risks discussed with patient.    Plan discussed with CRNA.

## 2024-10-02 LAB
LABORATORY COMMENT REPORT: NORMAL
PATH REPORT.FINAL DX SPEC: NORMAL
PATH REPORT.GROSS SPEC: NORMAL
PATH REPORT.TOTAL CANCER: NORMAL

## 2024-10-03 ENCOUNTER — TREATMENT (OUTPATIENT)
Dept: PHYSICAL THERAPY | Facility: CLINIC | Age: 64
End: 2024-10-03
Payer: MEDICARE

## 2024-10-03 DIAGNOSIS — M48.062 SPINAL STENOSIS OF LUMBAR REGION WITH NEUROGENIC CLAUDICATION: ICD-10-CM

## 2024-10-03 PROCEDURE — 97110 THERAPEUTIC EXERCISES: CPT | Mod: GP

## 2024-10-03 PROCEDURE — 97535 SELF CARE MNGMENT TRAINING: CPT | Mod: GP

## 2024-10-03 NOTE — PROGRESS NOTES
"Physical Therapy Treatment      Patient Name: Toño Bustamante  MRN: [id number]  Today's Date: 10/3/2024  Visit #7  Time Calculation  Start Time: 845  Stop Time: 925  Time Calculation (min): 40 min    Insurance:  Visit Limit:     Assessment:  Benji presents to therapy w/ improvements in his functional mobility. Today we spent time discussing his progress and identifying additional goals that he would like to achieve. We also reviewed mobility and lifting techniques to avoid unwanted stress on lumbar spine. Progressed HEP to meet patient-specific goals.     Patient's response to session: Increased knowledge and understanding    Patient is likely to benefit from skilled interventions to address their impairments, and treatment that includes: manual techniques to decrease pain and improve joint/soft-tissue mobility, as well as exercises to increase strength, endurance, and neuromotor control.     Plan:  Continue per POC.    Current Problem:   1. Spinal stenosis of lumbar region with neurogenic claudication  Follow Up In Physical Therapy          Subjective   Benji notes that he only seems to get pain in his mid-back while doing dishes. He feels like his is able to participate in all of his desired activities - \"just slowly.\" He feels like he is able to get up from the floor more efficiently. Still notes fatigue/weakness when negotiating stairs.     Objective       Treatments:  PT Therapeutic Procedures Time Entry  Therapeutic Exercise Time Entry: 15  Self-Care/Home Mgmt Trainin    Therapeutic Exercise (58115):  HEP progressed and reviewed in full  *Lateral step up  *Forward step up  *Stair march  *Daily walking program    Practiced and reviewed lifting mechanics and standing -> floor -> standing mobility and techniques to avoid unwanted stress to low back.    Self-care/Home Management (61820):  Discussed therapeutic goals, symptom presentation, various safe lifting mechanics, floor to standing " mobility/techniques, LE strengthening, waling program/progressions, answered all questions in full      *added to HEP

## 2024-10-10 ENCOUNTER — TREATMENT (OUTPATIENT)
Dept: PHYSICAL THERAPY | Facility: CLINIC | Age: 64
End: 2024-10-10
Payer: MEDICARE

## 2024-10-10 DIAGNOSIS — M48.062 SPINAL STENOSIS OF LUMBAR REGION WITH NEUROGENIC CLAUDICATION: ICD-10-CM

## 2024-10-10 PROCEDURE — 97110 THERAPEUTIC EXERCISES: CPT | Mod: GP

## 2024-10-10 NOTE — PROGRESS NOTES
Physical Therapy    Discharge Summary    Name: Toño Bustamante  MRN: [id number]  : 1960  Date: 10/10/24    Discharge Summary: PT    Discharge Information: Date of discharge 10/10/2024    Therapy Summary: Since initial evaluation Benji has made great progress towards his therapy goals and has been able to return to all of his desired activities. He feels confident managing his care independently from here on out. I provided him with my contact information if he should have any future questions or concerns.       Objective:  Lumbar ROM: ~25 loss globally - no increase in pain    Hip ROM (L/R)  Flexion: 120°/120° - mild LBP  Abduction: 40°/40°  Extension: 10°/10°  External Rotation: 40°/40°  Internal rotation: 20°/20     Specific Lower Extremity MMT (L/R)  Iliopsoas: 4+/5, 4+/5  Gluteus Hemant (prone): 4+/5, 4+/5  Hip External Rotation: 4+/5, 4+/5  Gluteus Medius: 4/5, 4/5  Lumbar Flexors: 4/5        Modified DIANE: 8%    Treatment:  Therapeutic Exercises: Reviewed in full  -Lateral step up  -Forward step up  -Stair march  -Daily walking program    Assessment     Education: Activity modifications, lifting mechanics, shoulder strengthening, assessment findings, answered all questions in full    Rehab Discharge Reason: Achieved all and/or the most significant goals(s)  Goals: Met 10/10/2024  Demonstrate independence with home exercise program  Tolerate increased exercise without adverse reaction  Demonstrate improved posture & proper body mechanics throughout session  Increase B hip ROM to pain free + symmetrical  Increase Core strength to pain free + WFL  Report decrease in pain by > 2 points to meet the MCID  Decrease score of Modified DIANE by > 6 points to meet the MCID  Perform ADLs without an increase symptoms  Ascend / descend stairs without an increase in symptoms  Ambulate >1 mile without an increase in symptoms (8,000)  Achieve pt. specific goals including: Travel 4+ hours in a vehicle - uninhibited by  pain

## 2024-10-23 ENCOUNTER — APPOINTMENT (OUTPATIENT)
Dept: PHYSICAL THERAPY | Facility: CLINIC | Age: 64
End: 2024-10-23
Payer: MEDICARE

## 2024-10-29 ENCOUNTER — APPOINTMENT (OUTPATIENT)
Dept: PHYSICAL THERAPY | Facility: CLINIC | Age: 64
End: 2024-10-29
Payer: MEDICARE

## 2024-11-05 ENCOUNTER — APPOINTMENT (OUTPATIENT)
Dept: PHYSICAL THERAPY | Facility: CLINIC | Age: 64
End: 2024-11-05

## 2025-03-06 ENCOUNTER — APPOINTMENT (OUTPATIENT)
Dept: PRIMARY CARE | Facility: CLINIC | Age: 65
End: 2025-03-06
Payer: MEDICARE

## 2025-03-06 ENCOUNTER — HOSPITAL ENCOUNTER (OUTPATIENT)
Dept: RADIOLOGY | Facility: CLINIC | Age: 65
Discharge: HOME | End: 2025-03-06
Payer: MEDICARE

## 2025-03-06 VITALS
HEIGHT: 73 IN | BODY MASS INDEX: 28.1 KG/M2 | OXYGEN SATURATION: 98 % | DIASTOLIC BLOOD PRESSURE: 86 MMHG | TEMPERATURE: 96.9 F | WEIGHT: 212 LBS | RESPIRATION RATE: 17 BRPM | HEART RATE: 68 BPM | SYSTOLIC BLOOD PRESSURE: 126 MMHG

## 2025-03-06 DIAGNOSIS — R73.9 HYPERGLYCEMIA: ICD-10-CM

## 2025-03-06 DIAGNOSIS — Z12.5 PROSTATE CANCER SCREENING: ICD-10-CM

## 2025-03-06 DIAGNOSIS — Z13.220 ENCOUNTER FOR LIPID SCREENING FOR CARDIOVASCULAR DISEASE: ICD-10-CM

## 2025-03-06 DIAGNOSIS — R19.7 DIARRHEA, UNSPECIFIED TYPE: ICD-10-CM

## 2025-03-06 DIAGNOSIS — R52 PAIN: ICD-10-CM

## 2025-03-06 DIAGNOSIS — Z13.6 ENCOUNTER FOR LIPID SCREENING FOR CARDIOVASCULAR DISEASE: ICD-10-CM

## 2025-03-06 DIAGNOSIS — Z00.00 MEDICARE ANNUAL WELLNESS VISIT, SUBSEQUENT: Primary | ICD-10-CM

## 2025-03-06 DIAGNOSIS — Z00.00 WELL ADULT EXAM: ICD-10-CM

## 2025-03-06 PROCEDURE — 99397 PER PM REEVAL EST PAT 65+ YR: CPT | Performed by: FAMILY MEDICINE

## 2025-03-06 PROCEDURE — G0439 PPPS, SUBSEQ VISIT: HCPCS | Performed by: FAMILY MEDICINE

## 2025-03-06 PROCEDURE — 72110 X-RAY EXAM L-2 SPINE 4/>VWS: CPT

## 2025-03-06 ASSESSMENT — ENCOUNTER SYMPTOMS
NAUSEA: 0
WEAKNESS: 0
HEADACHES: 0
POLYDIPSIA: 0
SHORTNESS OF BREATH: 0
POLYPHAGIA: 0
VOMITING: 0
FREQUENCY: 0
APPETITE CHANGE: 0
DIARRHEA: 0
ARTHRALGIAS: 0
DIZZINESS: 0
MYALGIAS: 0
CHEST TIGHTNESS: 0
FATIGUE: 0
NUMBNESS: 0

## 2025-03-06 ASSESSMENT — ACTIVITIES OF DAILY LIVING (ADL)
GROCERY_SHOPPING: INDEPENDENT
TAKING_MEDICATION: INDEPENDENT
DOING_HOUSEWORK: INDEPENDENT
DRESSING: INDEPENDENT
MANAGING_FINANCES: INDEPENDENT
BATHING: INDEPENDENT

## 2025-03-06 ASSESSMENT — PATIENT HEALTH QUESTIONNAIRE - PHQ9
2. FEELING DOWN, DEPRESSED OR HOPELESS: NOT AT ALL
1. LITTLE INTEREST OR PLEASURE IN DOING THINGS: NOT AT ALL
SUM OF ALL RESPONSES TO PHQ9 QUESTIONS 1 AND 2: 0

## 2025-03-06 NOTE — PROGRESS NOTES
"Subjective   Reason for Visit: Toño Bustamante is an 64 y.o. male here for a Medicare Wellness visit.     Past Medical, Surgical, and Family History reviewed and updated in chart.         HPI    Patient Care Team:  José Manuel Brunner DO as PCP - General  José Manuel Brunner DO as PCP - [employer] PCP  Nathan Marinelli, PT as Physical Therapist (Physical Therapy)     Review of Systems   Constitutional:  Negative for appetite change and fatigue.   Eyes:  Negative for visual disturbance.   Respiratory:  Negative for chest tightness and shortness of breath.    Cardiovascular:  Negative for chest pain and leg swelling.   Gastrointestinal:  Negative for diarrhea, nausea and vomiting.   Endocrine: Negative for polydipsia, polyphagia and polyuria.   Genitourinary:  Negative for frequency and urgency.   Musculoskeletal:  Negative for arthralgias and myalgias.   Neurological:  Negative for dizziness, syncope, weakness, numbness and headaches.       Objective   Vitals:  /86   Pulse 68   Temp 36.1 °C (96.9 °F)   Resp 17   Ht 1.854 m (6' 1\")   Wt 96.2 kg (212 lb)   SpO2 98%   BMI 27.97 kg/m²       Physical Exam  Constitutional:       Appearance: Normal appearance.   HENT:      Head: Normocephalic.   Eyes:      Conjunctiva/sclera: Conjunctivae normal.   Cardiovascular:      Rate and Rhythm: Normal rate and regular rhythm.      Heart sounds: Normal heart sounds.   Pulmonary:      Effort: Pulmonary effort is normal.      Breath sounds: Normal breath sounds.   Abdominal:      Tenderness: There is no abdominal tenderness.   Musculoskeletal:      Cervical back: Neck supple.      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   Neurological:      Mental Status: He is alert.         Assessment & Plan  Diarrhea, unspecified type    Orders:    Follow Up In [facility] - PCP    CBC and Auto Differential; Future    Comprehensive Metabolic Panel; Future    Hemoglobin " A1C; Future    Lipid Panel; Future    Prostate Specific Antigen; Future    Thyroid Stimulating Hormone; Future    T4, free; Future    Medicare annual wellness visit, subsequent    Orders:    CBC and Auto Differential; Future    Comprehensive Metabolic Panel; Future    Hemoglobin A1C; Future    Lipid Panel; Future    Prostate Specific Antigen; Future    Thyroid Stimulating Hormone; Future    T4, free; Future    Well adult exam    Orders:    CBC and Auto Differential; Future    Comprehensive Metabolic Panel; Future    Hemoglobin A1C; Future    Lipid Panel; Future    Prostate Specific Antigen; Future    Thyroid Stimulating Hormone; Future    T4, free; Future    Pain    Orders:    CBC and Auto Differential; Future    Comprehensive Metabolic Panel; Future    Hemoglobin A1C; Future    Lipid Panel; Future    Prostate Specific Antigen; Future    Thyroid Stimulating Hormone; Future    T4, free; Future    XR lumbar spine complete 4+ views; Future    Prostate cancer screening    Orders:    Prostate Specific Antigen; Future    Hyperglycemia    Orders:    Hemoglobin A1C; Future    Encounter for lipid screening for cardiovascular disease    Orders:    Lipid Panel; Future

## 2025-03-16 ENCOUNTER — PATIENT MESSAGE (OUTPATIENT)
Dept: PRIMARY CARE | Facility: CLINIC | Age: 65
End: 2025-03-16
Payer: MEDICARE

## 2025-03-16 DIAGNOSIS — R25.2 CRAMPS, MUSCLE, GENERAL: Primary | ICD-10-CM

## 2025-03-25 LAB
ALBUMIN SERPL-MCNC: 4.5 G/DL (ref 3.6–5.1)
ALP SERPL-CCNC: 73 U/L (ref 35–144)
ALT SERPL-CCNC: 16 U/L (ref 9–46)
ANION GAP SERPL CALCULATED.4IONS-SCNC: 8 MMOL/L (CALC) (ref 7–17)
AST SERPL-CCNC: 18 U/L (ref 10–35)
BILIRUB SERPL-MCNC: 0.5 MG/DL (ref 0.2–1.2)
BUN SERPL-MCNC: 13 MG/DL (ref 7–25)
CALCIUM SERPL-MCNC: 9.5 MG/DL (ref 8.6–10.3)
CHLORIDE SERPL-SCNC: 102 MMOL/L (ref 98–110)
CHOLEST SERPL-MCNC: 149 MG/DL
CHOLEST/HDLC SERPL: 2.7 (CALC)
CO2 SERPL-SCNC: 28 MMOL/L (ref 20–32)
CREAT SERPL-MCNC: 0.81 MG/DL (ref 0.7–1.35)
EGFRCR SERPLBLD CKD-EPI 2021: 97 ML/MIN/1.73M2
GLUCOSE SERPL-MCNC: 96 MG/DL (ref 65–99)
HDLC SERPL-MCNC: 55 MG/DL
LDLC SERPL CALC-MCNC: 79 MG/DL (CALC)
MAGNESIUM SERPL-MCNC: 2.2 MG/DL (ref 1.5–2.5)
NONHDLC SERPL-MCNC: 94 MG/DL (CALC)
POTASSIUM SERPL-SCNC: 4.7 MMOL/L (ref 3.5–5.3)
PROT SERPL-MCNC: 7 G/DL (ref 6.1–8.1)
PSA SERPL-MCNC: 2.38 NG/ML
SODIUM SERPL-SCNC: 138 MMOL/L (ref 135–146)
T4 FREE SERPL-MCNC: 1.3 NG/DL (ref 0.8–1.8)
TRIGL SERPL-MCNC: 74 MG/DL
TSH SERPL-ACNC: 1 MIU/L (ref 0.4–4.5)

## 2025-03-26 ENCOUNTER — TELEPHONE (OUTPATIENT)
Dept: PRIMARY CARE | Facility: CLINIC | Age: 65
End: 2025-03-26
Payer: MEDICARE

## 2025-03-26 LAB
BASOPHILS # BLD AUTO: 68 CELLS/UL (ref 0–200)
BASOPHILS NFR BLD AUTO: 1 %
EOSINOPHIL # BLD AUTO: 292 CELLS/UL (ref 15–500)
EOSINOPHIL NFR BLD AUTO: 4.3 %
ERYTHROCYTE [DISTWIDTH] IN BLOOD BY AUTOMATED COUNT: 13.3 % (ref 11–15)
EST. AVERAGE GLUCOSE BLD GHB EST-MCNC: 103 MG/DL
EST. AVERAGE GLUCOSE BLD GHB EST-SCNC: 5.7 MMOL/L
HBA1C MFR BLD: 5.2 % OF TOTAL HGB
HCT VFR BLD AUTO: 44.2 % (ref 38.5–50)
HGB BLD-MCNC: 14.9 G/DL (ref 13.2–17.1)
LYMPHOCYTES # BLD AUTO: 1945 CELLS/UL (ref 850–3900)
LYMPHOCYTES NFR BLD AUTO: 28.6 %
MCH RBC QN AUTO: 32.3 PG (ref 27–33)
MCHC RBC AUTO-ENTMCNC: 33.7 G/DL (ref 32–36)
MCV RBC AUTO: 95.9 FL (ref 80–100)
MONOCYTES # BLD AUTO: 530 CELLS/UL (ref 200–950)
MONOCYTES NFR BLD AUTO: 7.8 %
NEUTROPHILS # BLD AUTO: 3964 CELLS/UL (ref 1500–7800)
NEUTROPHILS NFR BLD AUTO: 58.3 %
PLATELET # BLD AUTO: 337 THOUSAND/UL (ref 140–400)
PMV BLD REES-ECKER: 9.7 FL (ref 7.5–12.5)
RBC # BLD AUTO: 4.61 MILLION/UL (ref 4.2–5.8)
WBC # BLD AUTO: 6.8 THOUSAND/UL (ref 3.8–10.8)

## 2025-03-26 NOTE — TELEPHONE ENCOUNTER
----- Message from José Manuel Brunner sent at 3/26/2025  8:15 AM EDT -----  Call Toño Bustamante Their labs were normal

## 2026-03-06 ENCOUNTER — APPOINTMENT (OUTPATIENT)
Dept: PRIMARY CARE | Facility: CLINIC | Age: 66
End: 2026-03-06
Payer: MEDICARE

## (undated) DEVICE — SUTURE, ETHILON, 2-0, 18 IN, FS, BLACK, BX/12

## (undated) DEVICE — ELECTRODE, ELECTROSURGICAL, BLADE, NONSTICK, MODIFIED, 6.5 IN X 165 MM

## (undated) DEVICE — Device

## (undated) DEVICE — TOWELS 4-PK

## (undated) DEVICE — PREP TRAY, VAGINAL

## (undated) DEVICE — HOLSTER, ELECTROSURGERY ACCESSORY, STERILE

## (undated) DEVICE — GLOVE, SURGICAL, PROTEXIS PI MICRO, 8.0, PF, LF

## (undated) DEVICE — SUTURE, VICRYL, 1, 27 IN, CT-1, VIOLET

## (undated) DEVICE — BANDAGE, ESMARK 4 IN X 9 FT, STERILE

## (undated) DEVICE — KIT, PATIENT CARE, JACKSON TABLE W/PRONE-SAFE HEADREST

## (undated) DEVICE — GOWN, ASTOUND, XL

## (undated) DEVICE — GLOVE, SURGICAL, PROTEXIS PI BLUE W/NEUTHERA, 8.5, PF, LF

## (undated) DEVICE — GLOVE, SURGICAL, PROTEXIS PI BLUE W/NEUTHERA, 6.5, PF, LF

## (undated) DEVICE — LEGEND, LUB DIFFUSER PACK

## (undated) DEVICE — DRAPE, INCISE, ANTIMICROBIAL, IOBAN 2, LARGE, 17 X 23 IN, DISPOSABLE, STERILE

## (undated) DEVICE — STRIP, SKIN CLOSURE, STERI STRIP, REINFORCED, 0.5 X 4 IN

## (undated) DEVICE — BANDAGE, ELASTIC, MATRIX, SELF-CLOSURE, 3 IN X 5 YD, LF

## (undated) DEVICE — ELECTRODE, ELECTROSURGICAL, BLADE, STANDARD, 2.75 IN

## (undated) DEVICE — SUTURE, VICRYL, 4-0, 18 IN, PS2, UNDYED

## (undated) DEVICE — CORD, FORCEP, BIPOLAR, DISP

## (undated) DEVICE — DRESSING, GAUZE, 16 PLY, 4 X 4 IN, STERILE

## (undated) DEVICE — SPONGE, DISSECTOR, PEANUT, 3/8, STERILE 5 FOAM HOLDER"

## (undated) DEVICE — DRAPE COVER, C ARM, FLOUROSCAN IMAGING SYS

## (undated) DEVICE — SYRINGE, 60 CC, IRRIGATION, BULB, CONTRO-BULB, PAPER POUCH

## (undated) DEVICE — TIP, SUCTION, FRAZIER, W/CONTROL VENT, 12 FR

## (undated) DEVICE — STRAP, ARM BOARD, 32 X 1.5

## (undated) DEVICE — SPHERE, STEALTHSTATION, 5-PK

## (undated) DEVICE — STRAP, VELCRO, BODY, 4 X 60IN, NS

## (undated) DEVICE — BONE, MILL, MIDAS REX, DUAL BLADE, ELECTRIC

## (undated) DEVICE — DRAIN, WOUND, ROUND, W/TROCAR, HOLE PATTERN, 10 IN, MEDIUM/LARGE, 3/16 X 49 IN

## (undated) DEVICE — GLOVE, SURGICAL, PROTEXIS PI , 6.5, PF, LF

## (undated) DEVICE — BANDAGE, STRETCH, CONFORM, 2 IN X 4.1 YD, NS

## (undated) DEVICE — MANIFOLD, 4 PORT NEPTUNE STANDARD

## (undated) DEVICE — KIT, MINOR, DOUBLE BASIN

## (undated) DEVICE — EVACUATOR, WOUND, CLOSED, 3 SPRING, 400 CC, Y CONNECTING TUBE

## (undated) DEVICE — SUTURE, MONOCRYL, 4-0, 27 IN, PS-2, UNDYED

## (undated) DEVICE — DRAPE, SHEET, THREE QUARTER, FAN FOLD, 57 X 77 IN

## (undated) DEVICE — SUTURE, VICRYL, 2-0, 27 IN, FSL, UNDYED

## (undated) DEVICE — GOWN, SURGICAL, ROYAL SILK, LG, STERILE

## (undated) DEVICE — BANDAGE, COHESIVE, HAND TEAR, COFLEX, 2 X 5 YDS, LF

## (undated) DEVICE — ADHESIVE, SKIN, MASTISOL, 2/3 CC VIAL

## (undated) DEVICE — DRAPE, SHEET, 17 X 23 IN

## (undated) DEVICE — DRAPE, SHEET, LAPAROTOMY